# Patient Record
Sex: MALE | Race: WHITE | NOT HISPANIC OR LATINO | Employment: OTHER | URBAN - METROPOLITAN AREA
[De-identification: names, ages, dates, MRNs, and addresses within clinical notes are randomized per-mention and may not be internally consistent; named-entity substitution may affect disease eponyms.]

---

## 2017-01-17 RX ORDER — LOSARTAN POTASSIUM 50 MG/1
TABLET ORAL
Qty: 90 TABLET | Refills: 1 | Status: SHIPPED | OUTPATIENT
Start: 2017-01-17 | End: 2017-07-27 | Stop reason: SDUPTHER

## 2017-02-06 ENCOUNTER — OFFICE VISIT (OUTPATIENT)
Dept: INTERNAL MEDICINE | Facility: CLINIC | Age: 82
End: 2017-02-06

## 2017-02-06 VITALS
RESPIRATION RATE: 16 BRPM | WEIGHT: 195 LBS | BODY MASS INDEX: 30.61 KG/M2 | SYSTOLIC BLOOD PRESSURE: 120 MMHG | DIASTOLIC BLOOD PRESSURE: 70 MMHG | HEIGHT: 67 IN | HEART RATE: 56 BPM | TEMPERATURE: 99.6 F | OXYGEN SATURATION: 97 %

## 2017-02-06 DIAGNOSIS — G47.9 DYSSOMNIA: ICD-10-CM

## 2017-02-06 DIAGNOSIS — N20.0 NEPHROLITHIASIS: ICD-10-CM

## 2017-02-06 DIAGNOSIS — E78.00 PURE HYPERCHOLESTEROLEMIA: ICD-10-CM

## 2017-02-06 DIAGNOSIS — E03.4 HYPOTHYROIDISM DUE TO ACQUIRED ATROPHY OF THYROID: ICD-10-CM

## 2017-02-06 DIAGNOSIS — I10 ESSENTIAL HYPERTENSION: Primary | ICD-10-CM

## 2017-02-06 DIAGNOSIS — R97.20 ELEVATED PSA: ICD-10-CM

## 2017-02-06 DIAGNOSIS — Z00.00 PREVENTATIVE HEALTH CARE: ICD-10-CM

## 2017-02-06 DIAGNOSIS — E11.9 TYPE 2 DIABETES MELLITUS WITHOUT COMPLICATION, WITHOUT LONG-TERM CURRENT USE OF INSULIN (HCC): ICD-10-CM

## 2017-02-06 DIAGNOSIS — Z12.5 SCREENING PSA (PROSTATE SPECIFIC ANTIGEN): ICD-10-CM

## 2017-02-06 PROCEDURE — 99214 OFFICE O/P EST MOD 30 MIN: CPT | Performed by: INTERNAL MEDICINE

## 2017-02-06 NOTE — PATIENT INSTRUCTIONS
1.  Continue usual medicines and supplements - as listed.    2.  Consider Cialis - 1/2 tablet - as needed for erections.    3.  Follow a low-calorie diabetic diet - low in salt and low in sugar.    4.  Exercises every day for physical fitness.    5.  Return in 3 months - fasting checkup and wellness exam.    6.  The nurse will call with test results.    7.  Consider urologist - for erectile dysfunction.

## 2017-02-06 NOTE — PROGRESS NOTES
Subjective   Vic Rahman is a 81 y.o. male.     History of Present Illness     The patient had a recent severe left kidney stone.  It failed lithotripsy and he had a urologic procedure to remove the stone percutaneously.  He has no more flank pain in his urine stream is normal.  The procedure finished on January 23 and he has passed normal blood in his urine.  He has had no fevers or chills.  He has had more erectile dysfunction.    The patient has many years of moderate GERD symptoms.  He has been on Protonix daily and has had marked improvement in his symptoms.  He has had no bloating heartburn or nausea.  He has had no chest pain.  He is required no antacids.    The patient's had a 2 year diagnosis of type 2 diabetes mellitus.  His hemoglobin A1c has been as high as 6.5 but is been down to 6.0 in the last year with diet alone.  She admits to being off of his diet often with too much high sugary foods.  He has no renal disease or neuropathy.  He hasn't an active lifestyle.  He has a history of diabetes in his mother.    The following portions of the patient's history were reviewed and updated as appropriate: allergies, current medications, past family history, past medical history, past social history, past surgical history and problem list.    Review of Systems   Constitutional: Negative for appetite change and fatigue.   HENT: Negative for ear pain and sore throat.    Respiratory: Negative for cough and shortness of breath.    Cardiovascular: Negative for chest pain and palpitations.   Gastrointestinal: Negative for abdominal pain and nausea.   Genitourinary: Negative for dysuria and frequency.        More difficulty with erectile dysfunction failing Viagra.  Urine stream markedly improved since removal of kidney stone   Musculoskeletal: Negative for arthralgias and back pain.   Neurological: Negative for dizziness and headaches.   Psychiatric/Behavioral: Positive for sleep disturbance. Negative for  "dysphoric mood. The patient is not nervous/anxious.         Moderate sleep impairment responds partially to melatonin       Objective   Blood pressure 120/70, pulse 56, temperature 99.6 °F (37.6 °C), temperature source Oral, resp. rate 16, height 67\" (170.2 cm), weight 195 lb (88.5 kg), SpO2 97 %.    Physical Exam   Constitutional: He is oriented to person, place, and time. He appears well-developed and well-nourished. No distress.   Cardiovascular: Normal rate and regular rhythm.    Moderate systolic murmur at apex   Pulmonary/Chest: Effort normal and breath sounds normal. He has no wheezes. He has no rales.   Abdominal: Soft. Bowel sounds are normal. He exhibits no distension and no mass. There is no tenderness.   Neurological: He is alert and oriented to person, place, and time. He exhibits normal muscle tone. Coordination normal.   Skin: Skin is warm and dry. No rash noted.   Psychiatric: He has a normal mood and affect. His behavior is normal. Judgment and thought content normal.   Nursing note and vitals reviewed.    Procedures  Assessment/Plan   Vic was seen today for hyperlipidemia.    Diagnoses and all orders for this visit:    Essential hypertension  -     Comprehensive Metabolic Panel    Pure hypercholesterolemia    Hypothyroidism due to acquired atrophy of thyroid    Type 2 diabetes mellitus without complication, without long-term current use of insulin    Nephrolithiasis  -     Urinalysis With / Culture If Indicated    Elevated PSA  -     PSA Screen  -     Ambulatory Referral to Urology    Preventative health care    Dyssomnia    Screening PSA (prostate specific antigen)  -     PSA Screen    Other orders  -     Microscopic Examination  -     PSA      His PSA is markedly elevated at 32.  The PSA is essentially doubled over the last year and a steady fashion.  We will ask him return to the urologist for reevaluating his PSA with possible biopsy.    Patient's type 2 diabetes mellitus continues on " dietary treatment.  He needs to have a goal weight below 180 by the end of the year.  I've asked him to improve his regular physical fitness daily basis.    The patient's status post renal stone removal and is doing well.  His urinalysis is fully clear.  I've asked him to continue regular water drinking and avoiding calcium supplementations.    Patient Instructions   1.  Continue usual medicines and supplements - as listed.    2.  Consider Cialis - 1/2 tablet - as needed for erections.    3.  Follow a low-calorie diabetic diet - low in salt and low in sugar.    4.  Exercises every day for physical fitness.    5.  Return in 3 months - fasting checkup and wellness exam.    6.  The nurse will call with test results.    7.  Consider urologist - for erectile dysfunction.    8.  New urology consultation for elevated PSA at 32.    9.  Fasting blood glucose elevated 114.  Tighten up diabetic diet and exercises daily.    10. Urinalysis and CMP otherwise acceptable.    11.  Plan on reducing Protonix to 20 mg and adding ranitidine for long-term safety.    Electronically signed Kamran Hanson M.D.2/7/2017 6:31 PM

## 2017-02-07 LAB
ALBUMIN SERPL-MCNC: 4.2 G/DL (ref 3.2–4.8)
ALBUMIN/GLOB SERPL: 1.5 G/DL (ref 1.5–2.5)
ALP SERPL-CCNC: 44 U/L (ref 25–100)
ALT SERPL-CCNC: 13 U/L (ref 7–40)
APPEARANCE UR: CLEAR
AST SERPL-CCNC: 16 U/L (ref 0–33)
BACTERIA #/AREA URNS HPF: NORMAL /HPF
BILIRUB SERPL-MCNC: 0.6 MG/DL (ref 0.3–1.2)
BILIRUB UR QL STRIP: NEGATIVE
BUN SERPL-MCNC: 12 MG/DL (ref 9–23)
BUN/CREAT SERPL: 12 (ref 7–25)
CALCIUM SERPL-MCNC: 9.5 MG/DL (ref 8.7–10.4)
CHLORIDE SERPL-SCNC: 103 MMOL/L (ref 99–109)
CO2 SERPL-SCNC: 32 MMOL/L (ref 20–31)
COLOR UR: YELLOW
CREAT SERPL-MCNC: 1 MG/DL (ref 0.6–1.3)
EPI CELLS #/AREA URNS HPF: NORMAL /HPF
GLOBULIN SER CALC-MCNC: 2.8 GM/DL
GLUCOSE SERPL-MCNC: 114 MG/DL (ref 70–100)
GLUCOSE UR QL: NEGATIVE
HGB UR QL STRIP: NEGATIVE
KETONES UR QL STRIP: NEGATIVE
LEUKOCYTE ESTERASE UR QL STRIP: NEGATIVE
MICRO URNS: NORMAL
MICRO URNS: NORMAL
MUCOUS THREADS URNS QL MICRO: PRESENT /HPF
NITRITE UR QL STRIP: NEGATIVE
PH UR STRIP: 6.5 [PH] (ref 5–7.5)
POTASSIUM SERPL-SCNC: 4.7 MMOL/L (ref 3.5–5.5)
PROT SERPL-MCNC: 7 G/DL (ref 5.7–8.2)
PROT UR QL STRIP: NEGATIVE
PSA SERPL-MCNC: 32.5 NG/ML (ref 0–4)
RBC #/AREA URNS HPF: NORMAL /HPF
SODIUM SERPL-SCNC: 140 MMOL/L (ref 132–146)
SP GR UR: 1.01 (ref 1–1.03)
URINALYSIS REFLEX: NORMAL
UROBILINOGEN UR STRIP-MCNC: 0.2 MG/DL (ref 0.2–1)
WBC #/AREA URNS HPF: NORMAL /HPF

## 2017-02-10 ENCOUNTER — TELEPHONE (OUTPATIENT)
Dept: INTERNAL MEDICINE | Facility: CLINIC | Age: 82
End: 2017-02-10

## 2017-02-10 NOTE — TELEPHONE ENCOUNTER
I called test results to pt. Per TGF  -New urology consult (scheduled) for PSA 32.  -. Tighten up diabetic diet and exercises daily.  -UA & CMP otherwise acceptable.  -Next ox 5/24/17

## 2017-04-01 PROBLEM — C61 PROSTATE CANCER: Status: ACTIVE | Noted: 2017-04-01

## 2017-04-25 ENCOUNTER — TELEPHONE (OUTPATIENT)
Dept: INTERNAL MEDICINE | Facility: CLINIC | Age: 82
End: 2017-04-25

## 2017-04-25 RX ORDER — RANITIDINE 300 MG/1
300 TABLET ORAL NIGHTLY
Qty: 90 TABLET | Refills: 1 | Status: SHIPPED | OUTPATIENT
Start: 2017-04-25 | End: 2017-10-27 | Stop reason: SDUPTHER

## 2017-04-25 RX ORDER — LEVOTHYROXINE SODIUM 0.12 MG/1
125 TABLET ORAL DAILY
Qty: 90 TABLET | Refills: 1 | Status: SHIPPED | OUTPATIENT
Start: 2017-04-25 | End: 2017-09-07 | Stop reason: DRUGHIGH

## 2017-04-25 RX ORDER — PANTOPRAZOLE SODIUM 20 MG/1
20 TABLET, DELAYED RELEASE ORAL EVERY MORNING
Qty: 90 TABLET | Refills: 1 | Status: SHIPPED | OUTPATIENT
Start: 2017-04-25 | End: 2017-09-05

## 2017-04-25 NOTE — TELEPHONE ENCOUNTER
----- Message from Pilar Quick sent at 4/24/2017 11:11 AM EDT -----  Contact: CURT- [phone]  TGF MENTIONED DECREASING PROTONIX AND ADDING RANITIDINE BUT HE NEVER CALLED IT IN. PLEASE CALL IN HIS LEVOTHYROXINE AS WELL- GAGE CHILDS      Pt notified will send in protonix 20mg qam (decreased dose) and ranitidine 300mg qhs per TGF.

## 2017-05-24 ENCOUNTER — OFFICE VISIT (OUTPATIENT)
Dept: INTERNAL MEDICINE | Facility: CLINIC | Age: 82
End: 2017-05-24

## 2017-05-24 VITALS
OXYGEN SATURATION: 97 % | RESPIRATION RATE: 18 BRPM | HEIGHT: 68 IN | SYSTOLIC BLOOD PRESSURE: 118 MMHG | BODY MASS INDEX: 30.01 KG/M2 | DIASTOLIC BLOOD PRESSURE: 70 MMHG | WEIGHT: 198 LBS | TEMPERATURE: 96.9 F | HEART RATE: 60 BPM

## 2017-05-24 DIAGNOSIS — M25.561 CHRONIC PAIN OF BOTH KNEES: ICD-10-CM

## 2017-05-24 DIAGNOSIS — Z00.00 PREVENTATIVE HEALTH CARE: ICD-10-CM

## 2017-05-24 DIAGNOSIS — N20.0 NEPHROLITHIASIS: ICD-10-CM

## 2017-05-24 DIAGNOSIS — E03.4 HYPOTHYROIDISM DUE TO ACQUIRED ATROPHY OF THYROID: ICD-10-CM

## 2017-05-24 DIAGNOSIS — K21.9 GASTROESOPHAGEAL REFLUX DISEASE WITHOUT ESOPHAGITIS: ICD-10-CM

## 2017-05-24 DIAGNOSIS — C61 PROSTATE CANCER (HCC): ICD-10-CM

## 2017-05-24 DIAGNOSIS — E78.00 PURE HYPERCHOLESTEROLEMIA: ICD-10-CM

## 2017-05-24 DIAGNOSIS — G89.29 CHRONIC PAIN OF BOTH KNEES: ICD-10-CM

## 2017-05-24 DIAGNOSIS — E11.9 TYPE 2 DIABETES MELLITUS WITHOUT COMPLICATION, WITHOUT LONG-TERM CURRENT USE OF INSULIN (HCC): Primary | ICD-10-CM

## 2017-05-24 DIAGNOSIS — M25.562 CHRONIC PAIN OF BOTH KNEES: ICD-10-CM

## 2017-05-24 DIAGNOSIS — I10 ESSENTIAL HYPERTENSION: ICD-10-CM

## 2017-05-24 LAB
ALBUMIN SERPL-MCNC: 4.4 G/DL (ref 3.2–4.8)
ALBUMIN/GLOB SERPL: 1.6 G/DL (ref 1.5–2.5)
ALP SERPL-CCNC: 41 U/L (ref 25–100)
ALT SERPL-CCNC: 15 U/L (ref 7–40)
AST SERPL-CCNC: 18 U/L (ref 0–33)
BILIRUB SERPL-MCNC: 0.5 MG/DL (ref 0.3–1.2)
BUN SERPL-MCNC: 17 MG/DL (ref 9–23)
BUN/CREAT SERPL: 18.9 (ref 7–25)
CALCIUM SERPL-MCNC: 9.8 MG/DL (ref 8.7–10.4)
CHLORIDE SERPL-SCNC: 109 MMOL/L (ref 99–109)
CO2 SERPL-SCNC: 29 MMOL/L (ref 20–31)
CREAT SERPL-MCNC: 0.9 MG/DL (ref 0.6–1.3)
GLOBULIN SER CALC-MCNC: 2.8 GM/DL
GLUCOSE SERPL-MCNC: 112 MG/DL (ref 70–100)
HBA1C MFR BLD: 5.9 % (ref 4.8–5.6)
POTASSIUM SERPL-SCNC: 4.5 MMOL/L (ref 3.5–5.5)
PROT SERPL-MCNC: 7.2 G/DL (ref 5.7–8.2)
PSA SERPL-MCNC: 2.84 NG/ML (ref 0–4)
SODIUM SERPL-SCNC: 141 MMOL/L (ref 132–146)
TSH SERPL DL<=0.005 MIU/L-ACNC: 2.06 MIU/ML (ref 0.35–5.35)

## 2017-05-24 PROCEDURE — G0439 PPPS, SUBSEQ VISIT: HCPCS | Performed by: INTERNAL MEDICINE

## 2017-05-24 PROCEDURE — 99214 OFFICE O/P EST MOD 30 MIN: CPT | Performed by: INTERNAL MEDICINE

## 2017-05-24 PROCEDURE — 96160 PT-FOCUSED HLTH RISK ASSMT: CPT | Performed by: INTERNAL MEDICINE

## 2017-05-25 LAB
APPEARANCE UR: CLEAR
BACTERIA #/AREA URNS HPF: NORMAL /HPF
BILIRUB UR QL STRIP: NEGATIVE
COLOR UR: YELLOW
EPI CELLS #/AREA URNS HPF: NORMAL /HPF
GLUCOSE UR QL: NEGATIVE
HGB UR QL STRIP: NEGATIVE
KETONES UR QL STRIP: NEGATIVE
LEUKOCYTE ESTERASE UR QL STRIP: NEGATIVE
MICRO URNS: NORMAL
MICRO URNS: NORMAL
MUCOUS THREADS URNS QL MICRO: PRESENT /HPF
NITRITE UR QL STRIP: NEGATIVE
PH UR STRIP: 6 [PH] (ref 5–7.5)
PROT UR QL STRIP: NEGATIVE
RBC #/AREA URNS HPF: NORMAL /HPF
SP GR UR: 1.01 (ref 1–1.03)
URINALYSIS REFLEX: NORMAL
UROBILINOGEN UR STRIP-MCNC: 0.2 MG/DL (ref 0.2–1)
WBC #/AREA URNS HPF: NORMAL /HPF

## 2017-05-31 ENCOUNTER — TELEPHONE (OUTPATIENT)
Dept: INTERNAL MEDICINE | Facility: CLINIC | Age: 82
End: 2017-05-31

## 2017-07-27 RX ORDER — LOSARTAN POTASSIUM 50 MG/1
TABLET ORAL
Qty: 90 TABLET | Refills: 1 | Status: SHIPPED | OUTPATIENT
Start: 2017-07-27 | End: 2018-02-09 | Stop reason: SDUPTHER

## 2017-08-15 RX ORDER — OXAZEPAM 10 MG
10 CAPSULE ORAL NIGHTLY PRN
Qty: 60 CAPSULE | Refills: 0 | OUTPATIENT
Start: 2017-08-15 | End: 2018-06-25 | Stop reason: SDUPTHER

## 2017-08-15 NOTE — TELEPHONE ENCOUNTER
Refill request received for oxazepam. Pt states rarely takes it, usually when he goes out of town for sleep. Refill auth'd per TGF.

## 2017-09-05 ENCOUNTER — OFFICE VISIT (OUTPATIENT)
Dept: INTERNAL MEDICINE | Facility: CLINIC | Age: 82
End: 2017-09-05

## 2017-09-05 VITALS
HEIGHT: 67 IN | RESPIRATION RATE: 16 BRPM | OXYGEN SATURATION: 97 % | BODY MASS INDEX: 31.39 KG/M2 | SYSTOLIC BLOOD PRESSURE: 110 MMHG | WEIGHT: 200 LBS | HEART RATE: 68 BPM | DIASTOLIC BLOOD PRESSURE: 60 MMHG | TEMPERATURE: 98.1 F

## 2017-09-05 DIAGNOSIS — H61.23 BILATERAL IMPACTED CERUMEN: ICD-10-CM

## 2017-09-05 DIAGNOSIS — C61 PROSTATE CANCER (HCC): ICD-10-CM

## 2017-09-05 DIAGNOSIS — M25.561 CHRONIC PAIN OF BOTH KNEES: ICD-10-CM

## 2017-09-05 DIAGNOSIS — N20.0 NEPHROLITHIASIS: ICD-10-CM

## 2017-09-05 DIAGNOSIS — I10 ESSENTIAL HYPERTENSION: ICD-10-CM

## 2017-09-05 DIAGNOSIS — F33.42 RECURRENT MAJOR DEPRESSIVE DISORDER, IN FULL REMISSION (HCC): ICD-10-CM

## 2017-09-05 DIAGNOSIS — M25.562 CHRONIC PAIN OF BOTH KNEES: ICD-10-CM

## 2017-09-05 DIAGNOSIS — E03.4 HYPOTHYROIDISM DUE TO ACQUIRED ATROPHY OF THYROID: ICD-10-CM

## 2017-09-05 DIAGNOSIS — G47.9 DYSSOMNIA: ICD-10-CM

## 2017-09-05 DIAGNOSIS — G89.29 CHRONIC PAIN OF BOTH KNEES: ICD-10-CM

## 2017-09-05 DIAGNOSIS — E78.00 PURE HYPERCHOLESTEROLEMIA: Primary | ICD-10-CM

## 2017-09-05 DIAGNOSIS — K21.9 GASTROESOPHAGEAL REFLUX DISEASE WITHOUT ESOPHAGITIS: ICD-10-CM

## 2017-09-05 DIAGNOSIS — I05.9 MITRAL VALVE DISEASE: ICD-10-CM

## 2017-09-05 DIAGNOSIS — E11.9 TYPE 2 DIABETES MELLITUS WITHOUT COMPLICATION, WITHOUT LONG-TERM CURRENT USE OF INSULIN (HCC): ICD-10-CM

## 2017-09-05 PROBLEM — H61.20 CERUMEN IMPACTION: Status: ACTIVE | Noted: 2017-09-05

## 2017-09-05 PROCEDURE — 99215 OFFICE O/P EST HI 40 MIN: CPT | Performed by: INTERNAL MEDICINE

## 2017-09-05 PROCEDURE — 93000 ELECTROCARDIOGRAM COMPLETE: CPT | Performed by: INTERNAL MEDICINE

## 2017-09-05 RX ORDER — PANTOPRAZOLE SODIUM 20 MG/1
20 TABLET, DELAYED RELEASE ORAL 3 TIMES WEEKLY
Qty: 90 TABLET | Refills: 1
Start: 2017-09-06 | End: 2017-12-05

## 2017-09-05 RX ORDER — FLUTICASONE PROPIONATE 50 MCG
1 SPRAY, SUSPENSION (ML) NASAL DAILY PRN
Start: 2017-09-05 | End: 2018-05-07

## 2017-09-05 NOTE — PATIENT INSTRUCTIONS
1.  Continue usual medicines and supplements - as listed.    2.  Decrease pantoprazole - Monday Wednesday Friday only.    3.  Follow a low-calorie diabetic diet - low in salt, sugar, and breads.    4.  Maintain exercise program every week - build strength and fitness.    5.  Visit ear specialist - to have wax removed.    6.  Bring blood pressure cuff to the office - to verify accuracy.    7.  Return visit 3 months - fasting checkup.
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
## 2017-09-06 LAB
ALBUMIN SERPL-MCNC: 4.2 G/DL (ref 3.2–4.8)
ALBUMIN/GLOB SERPL: 1.6 G/DL (ref 1.5–2.5)
ALP SERPL-CCNC: 41 U/L (ref 25–100)
ALT SERPL-CCNC: 27 U/L (ref 7–40)
APPEARANCE UR: CLEAR
AST SERPL-CCNC: 23 U/L (ref 0–33)
BACTERIA #/AREA URNS HPF: ABNORMAL /HPF
BASOPHILS # BLD AUTO: 0.02 10*3/MM3 (ref 0–0.2)
BASOPHILS NFR BLD AUTO: 0.4 % (ref 0–1)
BILIRUB SERPL-MCNC: 0.6 MG/DL (ref 0.3–1.2)
BILIRUB UR QL STRIP: NEGATIVE
BUN SERPL-MCNC: 18 MG/DL (ref 9–23)
BUN/CREAT SERPL: 18 (ref 7–25)
CALCIUM SERPL-MCNC: 9.3 MG/DL (ref 8.7–10.4)
CASTS URNS MICRO: ABNORMAL
CHLORIDE SERPL-SCNC: 110 MMOL/L (ref 99–109)
CHOLEST SERPL-MCNC: 214 MG/DL (ref 0–200)
CO2 SERPL-SCNC: 27 MMOL/L (ref 20–31)
COLOR UR: YELLOW
CREAT SERPL-MCNC: 1 MG/DL (ref 0.6–1.3)
CRP SERPL-MCNC: 0.44 MG/DL (ref 0–1)
EOSINOPHIL # BLD AUTO: 0.14 10*3/MM3 (ref 0–0.3)
EOSINOPHIL NFR BLD AUTO: 2.9 % (ref 0–3)
EPI CELLS #/AREA URNS HPF: ABNORMAL /HPF
ERYTHROCYTE [DISTWIDTH] IN BLOOD BY AUTOMATED COUNT: 13.3 % (ref 11.3–14.5)
GLOBULIN SER CALC-MCNC: 2.6 GM/DL
GLUCOSE SERPL-MCNC: 95 MG/DL (ref 70–100)
GLUCOSE UR QL: NEGATIVE
HBA1C MFR BLD: 6.2 % (ref 4.8–5.6)
HCT VFR BLD AUTO: 42.4 % (ref 38.9–50.9)
HDLC SERPL-MCNC: 62 MG/DL (ref 40–60)
HGB BLD-MCNC: 13.9 G/DL (ref 13.1–17.5)
HGB UR QL STRIP: NEGATIVE
IMM GRANULOCYTES # BLD: 0.01 10*3/MM3 (ref 0–0.03)
IMM GRANULOCYTES NFR BLD: 0.2 % (ref 0–0.6)
KETONES UR QL STRIP: NEGATIVE
LDLC SERPL CALC-MCNC: 123 MG/DL (ref 0–100)
LEUKOCYTE ESTERASE UR QL STRIP: ABNORMAL
LYMPHOCYTES # BLD AUTO: 1.07 10*3/MM3 (ref 0.6–4.8)
LYMPHOCYTES NFR BLD AUTO: 22.3 % (ref 24–44)
MCH RBC QN AUTO: 30.8 PG (ref 27–31)
MCHC RBC AUTO-ENTMCNC: 32.8 G/DL (ref 32–36)
MCV RBC AUTO: 94 FL (ref 80–99)
MONOCYTES # BLD AUTO: 0.49 10*3/MM3 (ref 0–1)
MONOCYTES NFR BLD AUTO: 10.2 % (ref 0–12)
NEUTROPHILS # BLD AUTO: 3.07 10*3/MM3 (ref 1.5–8.3)
NEUTROPHILS NFR BLD AUTO: 64 % (ref 41–71)
NITRITE UR QL STRIP: NEGATIVE
PH UR STRIP: 6.5 [PH] (ref 5–8)
PLATELET # BLD AUTO: 164 10*3/MM3 (ref 150–450)
POTASSIUM SERPL-SCNC: 4.9 MMOL/L (ref 3.5–5.5)
PROT SERPL-MCNC: 6.8 G/DL (ref 5.7–8.2)
PROT UR QL STRIP: NEGATIVE
PSA SERPL-MCNC: 0.89 NG/ML (ref 0–4)
RBC # BLD AUTO: 4.51 10*6/MM3 (ref 4.2–5.76)
RBC #/AREA URNS HPF: ABNORMAL /HPF
SODIUM SERPL-SCNC: 139 MMOL/L (ref 132–146)
SP GR UR: 1.02 (ref 1–1.03)
T3FREE SERPL-MCNC: 3 PG/ML (ref 2–4.4)
T4 FREE SERPL-MCNC: 1.08 NG/DL (ref 0.89–1.76)
TRIGL SERPL-MCNC: 144 MG/DL (ref 0–150)
TSH SERPL DL<=0.005 MIU/L-ACNC: 5.5 MIU/ML (ref 0.35–5.35)
UROBILINOGEN UR STRIP-MCNC: ABNORMAL MG/DL
VLDLC SERPL CALC-MCNC: 28.8 MG/DL
WBC # BLD AUTO: 4.8 10*3/MM3 (ref 3.5–10.8)
WBC #/AREA URNS HPF: ABNORMAL /HPF

## 2017-09-06 RX ORDER — PRAVASTATIN SODIUM 20 MG
1 TABLET ORAL DAILY
COMMUNITY
End: 2017-09-07 | Stop reason: SDUPTHER

## 2017-09-06 RX ORDER — UBIDECARENONE 200 MG
1 CAPSULE ORAL DAILY
COMMUNITY
End: 2017-09-07 | Stop reason: SDUPTHER

## 2017-09-07 ENCOUNTER — TELEPHONE (OUTPATIENT)
Dept: INTERNAL MEDICINE | Facility: CLINIC | Age: 82
End: 2017-09-07

## 2017-09-07 RX ORDER — PRAVASTATIN SODIUM 20 MG
20 TABLET ORAL DAILY
Qty: 90 TABLET | Refills: 1 | Status: SHIPPED | OUTPATIENT
Start: 2017-09-07 | End: 2018-03-07 | Stop reason: SDUPTHER

## 2017-09-07 RX ORDER — LEVOTHYROXINE SODIUM 137 UG/1
137 CAPSULE ORAL DAILY
Qty: 90 CAPSULE | Refills: 1 | Status: SHIPPED | OUTPATIENT
Start: 2017-09-07 | End: 2017-09-08

## 2017-09-07 RX ORDER — UBIDECARENONE 200 MG
200 CAPSULE ORAL DAILY
Qty: 90 CAPSULE | Refills: 1 | Status: SHIPPED | OUTPATIENT
Start: 2017-09-07 | End: 2019-08-08

## 2017-09-07 NOTE — TELEPHONE ENCOUNTER
Called labs to pt.  Per TGF:  -PSA improved 0.89.   -HbA1c incr to 6.2%. Follow tight low sugar diabetic diet.    -LDL elev 123. pravastatin 20 mg qd & CQ10 200 mg qd Repeat blood tests after first month of treatment.   -TSH 5.5 = poor thyroid control. Incr [id number] MCG QD.  Repeat blood test after first month of treatment.    Verify routine thyroid medication.  -Other tests ok - no tx change  -Start ASA 81 mg QD for primary prevention.  -Stop all tobacco use, use sugar-free gum as needed.  Pt verb understanding.

## 2017-09-08 ENCOUNTER — TELEPHONE (OUTPATIENT)
Dept: INTERNAL MEDICINE | Facility: CLINIC | Age: 82
End: 2017-09-08

## 2017-09-08 RX ORDER — LEVOTHYROXINE SODIUM 137 UG/1
137 TABLET ORAL DAILY
Qty: 90 TABLET | Refills: 1 | Status: SHIPPED | OUTPATIENT
Start: 2017-09-08 | End: 2017-12-07

## 2017-09-08 NOTE — TELEPHONE ENCOUNTER
----- Message from Anant Sal sent at 9/8/2017 10:35 AM EDT -----  Contact: RASHARD KIM [facility] [place]  CLARIFICATION ABOUT THE MEDICATION THAT WAS CALLED IN YESTERDAY.  [place]ANK [phone]      Levothyroxine was sent in for capsules instead of usual tablets. New script for tablets sent in.

## 2017-10-17 DIAGNOSIS — E03.4 HYPOTHYROIDISM DUE TO ACQUIRED ATROPHY OF THYROID: Primary | ICD-10-CM

## 2017-10-17 DIAGNOSIS — E78.00 PURE HYPERCHOLESTEROLEMIA: ICD-10-CM

## 2017-10-19 ENCOUNTER — LAB REQUISITION (OUTPATIENT)
Dept: LAB | Facility: HOSPITAL | Age: 82
End: 2017-10-19

## 2017-10-19 DIAGNOSIS — Z00.00 ROUTINE GENERAL MEDICAL EXAMINATION AT A HEALTH CARE FACILITY: ICD-10-CM

## 2017-10-19 PROCEDURE — 36415 COLL VENOUS BLD VENIPUNCTURE: CPT | Performed by: INTERNAL MEDICINE

## 2017-10-20 LAB
ALBUMIN SERPL-MCNC: 4.3 G/DL (ref 3.2–4.8)
ALBUMIN/GLOB SERPL: 1.7 G/DL (ref 1.5–2.5)
ALP SERPL-CCNC: 44 U/L (ref 25–100)
ALT SERPL-CCNC: 23 U/L (ref 7–40)
AST SERPL-CCNC: 21 U/L (ref 0–33)
BILIRUB SERPL-MCNC: 0.5 MG/DL (ref 0.3–1.2)
BUN SERPL-MCNC: 20 MG/DL (ref 9–23)
BUN/CREAT SERPL: 20 (ref 7–25)
CALCIUM SERPL-MCNC: 9.4 MG/DL (ref 8.7–10.4)
CHLORIDE SERPL-SCNC: 108 MMOL/L (ref 99–109)
CHOLEST SERPL-MCNC: 160 MG/DL (ref 0–200)
CO2 SERPL-SCNC: 25 MMOL/L (ref 20–31)
CREAT SERPL-MCNC: 1 MG/DL (ref 0.6–1.3)
GFR SERPLBLD CREATININE-BSD FMLA CKD-EPI: 72 ML/MIN/1.73
GFR SERPLBLD CREATININE-BSD FMLA CKD-EPI: 87 ML/MIN/1.73
GLOBULIN SER CALC-MCNC: 2.5 GM/DL
GLUCOSE SERPL-MCNC: 98 MG/DL (ref 70–100)
HDLC SERPL-MCNC: 62 MG/DL (ref 40–60)
LDLC SERPL CALC-MCNC: 69 MG/DL (ref 0–100)
POTASSIUM SERPL-SCNC: 4.2 MMOL/L (ref 3.5–5.5)
PROT SERPL-MCNC: 6.8 G/DL (ref 5.7–8.2)
SODIUM SERPL-SCNC: 139 MMOL/L (ref 132–146)
T3FREE SERPL-MCNC: 3.3 PG/ML (ref 2–4.4)
T4 FREE SERPL-MCNC: 1.42 NG/DL (ref 0.89–1.76)
TRIGL SERPL-MCNC: 143 MG/DL (ref 0–150)
TSH SERPL DL<=0.005 MIU/L-ACNC: 5.12 MIU/ML (ref 0.35–5.35)
VLDLC SERPL CALC-MCNC: 28.6 MG/DL

## 2017-10-22 ENCOUNTER — RESULTS ENCOUNTER (OUTPATIENT)
Dept: INTERNAL MEDICINE | Facility: CLINIC | Age: 82
End: 2017-10-22

## 2017-10-22 DIAGNOSIS — E78.00 PURE HYPERCHOLESTEROLEMIA: ICD-10-CM

## 2017-10-22 DIAGNOSIS — E03.4 HYPOTHYROIDISM DUE TO ACQUIRED ATROPHY OF THYROID: ICD-10-CM

## 2017-10-30 RX ORDER — RANITIDINE 300 MG/1
TABLET ORAL
Qty: 90 TABLET | Refills: 1 | Status: SHIPPED | OUTPATIENT
Start: 2017-10-30 | End: 2018-05-08 | Stop reason: SDUPTHER

## 2017-12-05 ENCOUNTER — LAB REQUISITION (OUTPATIENT)
Dept: LAB | Facility: HOSPITAL | Age: 82
End: 2017-12-05

## 2017-12-05 ENCOUNTER — OFFICE VISIT (OUTPATIENT)
Dept: INTERNAL MEDICINE | Facility: CLINIC | Age: 82
End: 2017-12-05

## 2017-12-05 VITALS
TEMPERATURE: 97.8 F | WEIGHT: 203 LBS | HEART RATE: 64 BPM | SYSTOLIC BLOOD PRESSURE: 110 MMHG | RESPIRATION RATE: 16 BRPM | DIASTOLIC BLOOD PRESSURE: 60 MMHG | OXYGEN SATURATION: 95 % | BODY MASS INDEX: 31.79 KG/M2

## 2017-12-05 DIAGNOSIS — J21.9 ACUTE BRONCHIOLITIS DUE TO UNSPECIFIED ORGANISM: ICD-10-CM

## 2017-12-05 DIAGNOSIS — E03.4 HYPOTHYROIDISM DUE TO ACQUIRED ATROPHY OF THYROID: ICD-10-CM

## 2017-12-05 DIAGNOSIS — E11.9 TYPE 2 DIABETES MELLITUS WITHOUT COMPLICATION, WITHOUT LONG-TERM CURRENT USE OF INSULIN (HCC): ICD-10-CM

## 2017-12-05 DIAGNOSIS — C61 PROSTATE CANCER (HCC): ICD-10-CM

## 2017-12-05 DIAGNOSIS — E78.00 PURE HYPERCHOLESTEROLEMIA: Primary | ICD-10-CM

## 2017-12-05 DIAGNOSIS — Z87.891 PERSONAL HISTORY OF TOBACCO USE, PRESENTING HAZARDS TO HEALTH: ICD-10-CM

## 2017-12-05 DIAGNOSIS — Z00.00 ROUTINE GENERAL MEDICAL EXAMINATION AT A HEALTH CARE FACILITY: ICD-10-CM

## 2017-12-05 DIAGNOSIS — N20.0 NEPHROLITHIASIS: ICD-10-CM

## 2017-12-05 DIAGNOSIS — I10 ESSENTIAL HYPERTENSION: ICD-10-CM

## 2017-12-05 DIAGNOSIS — K21.9 GASTROESOPHAGEAL REFLUX DISEASE WITHOUT ESOPHAGITIS: ICD-10-CM

## 2017-12-05 PROCEDURE — 99214 OFFICE O/P EST MOD 30 MIN: CPT | Performed by: INTERNAL MEDICINE

## 2017-12-05 RX ORDER — AZITHROMYCIN 250 MG/1
TABLET, FILM COATED ORAL
Qty: 60 TABLET | Refills: 0 | Status: SHIPPED | OUTPATIENT
Start: 2017-12-05 | End: 2017-12-14

## 2017-12-05 NOTE — PROGRESS NOTES
Subjective   Vic Rahman is a 82 y.o. male.     History of Present Illness     The patient's had increased recurrent heartburn in the last few months.  He has stopped Protonix and is using Zantac alone for acid suppression.  He continues to smoke a pipe daily.  He has had no nausea or vomiting.  He has a history of GERD symptomatology now for more than 10 years.    The following portions of the patient's history were reviewed and updated as appropriate: allergies, current medications, past family history, past medical history, past social history, past surgical history and problem list.    Review of Systems   Constitutional: Negative for appetite change and fatigue.   HENT: Negative for ear pain and sore throat.    Respiratory: Negative for cough and shortness of breath.    Cardiovascular: Negative for chest pain and palpitations.   Gastrointestinal: Negative for abdominal pain and nausea.   Endocrine: Positive for heat intolerance.   Musculoskeletal: Positive for arthralgias and gait problem. Negative for back pain.   Neurological: Negative for dizziness and headaches.       Objective   Blood pressure 110/60, pulse 64, temperature 97.8 °F (36.6 °C), temperature source Oral, resp. rate 16, weight 92.1 kg (203 lb), SpO2 95 %.    Physical Exam   Constitutional: He is oriented to person, place, and time. He appears well-developed and well-nourished. No distress.   HENT:   Right Ear: External ear normal.   Left Ear: External ear normal.   Mouth/Throat: Oropharynx is clear and moist.   Neck: Normal range of motion. Neck supple. No JVD present.   Cardiovascular: Normal rate, regular rhythm and normal heart sounds.    Pulmonary/Chest: Effort normal and breath sounds normal. He has no wheezes. He has no rales.   Lymphadenopathy:     He has no cervical adenopathy.   Neurological: He is alert and oriented to person, place, and time. He exhibits normal muscle tone. Coordination normal.   Psychiatric: He has a normal mood  and affect. His behavior is normal. Judgment and thought content normal.   Nursing note and vitals reviewed.    Procedures  Assessment/Plan   Vic was seen today for heartburn.    Diagnoses and all orders for this visit:    Pure hypercholesterolemia  -     Comprehensive Metabolic Panel  -     Lipid Panel    Essential hypertension    Personal history of tobacco use, presenting hazards to health    Nephrolithiasis    Type 2 diabetes mellitus without complication, without long-term current use of insulin  -     Hemoglobin A1c    Hypothyroidism due to acquired atrophy of thyroid  -     T3, Free  -     T4, Free  -     TSH    Gastroesophageal reflux disease without esophagitis    Acute bronchiolitis due to unspecified organism  -     CBC & Differential  -     C-reactive Protein    Prostate cancer  -     PSA    Other orders  -     azithromycin (ZITHROMAX Z-LEANN) 250 MG tablet; Take 2 tablets the first day, then 1 tablet daily for 4 days.  -     T4, Free  -     TSH  -     T3, Free  -     levothyroxine (SYNTHROID, LEVOTHROID) 150 MCG tablet; Take 1 tablet by mouth Daily.  -     metFORMIN ER (GLUCOPHAGE XR) 500 MG 24 hr tablet; Take 1 tablet by mouth Daily With Breakfast.    The patient has more GERD symptoms likely related to continued tobacco use.  I strongly advised him to stop all tobacco and use Pepto-Bismol as needed.  Recurrent PPIs aren't unnecessary risk for cognitive impairment.      The patient has 2 weeks of persistent cough congestion.  After full discussion he has opted for a Z-Leann to clear his congestion for the coming week.  I've cautioned him to continued cigarette smoking will likely cause Sr. Relapse.    Patient's diabetic control has worsened now with his highest hemoglobin A1c.  Metformin and improve diabetic diet should manage his diabetes well.    The patient's thyroid studies are again borderline low.  He should increase Synthroid and recheck in about 6 weeks.    [facility]  [facility].  He has some hot flashes but he feels are manageable.  His PSA has dropped from 0.9-0.5.    Patient Instructions   1.  At your discretion - start azithromycin 2 tablets now - and one tablet daily for 4 more days - for bronchitis.    2.  Stop all tobacco - use nicotine mints as needed - to prevent  tobacco craving.    3.  Follow a low-calorie diabetic diet - low in salt and low in sugar.    4.  Continue regular visits with urologist - for prostate cancer.    5.  Take Pepto-Bismol 1 tablespoon or 1 tablet - at bedtime - to prevent heartburn.    6.  The nurse will call with test results.    7.  Return visit 3 months - fasting checkup.    8.  Thyroid blood test indicate need for higher Synthroid dose - increased 150 MCG each morning on an empty stomach.    9.  Hemoglobin A1c elevated 6.9%.  Start metformin  mg each morning for better by diabetic control.    10.  Return in late January for repeat thyroid studies and hemoglobin A1c.    11.  Red count and chemistry panel otherwise acceptable.    Electronically signed Kamran Hanson M.D.12/7/2017 7:24 AM

## 2017-12-05 NOTE — PATIENT INSTRUCTIONS
1.  At your discretion - start azithromycin 2 tablets now - and one tablet daily for 4 more days - for bronchitis.    2.  Stop all tobacco - use nicotine mints as needed - to prevent  tobacco craving.    3.  Follow a low-calorie diabetic diet - low in salt and low in sugar.    4.  Continue regular visits with urologist - for prostate cancer.    5.  Take Pepto-Bismol 1 tablespoon or 1 tablet - at bedtime - to prevent heartburn.    6.  The nurse will call with test results.    7.  Return visit 3 months - fasting checkup.

## 2017-12-06 LAB
ALBUMIN SERPL-MCNC: 4.4 G/DL (ref 3.2–4.8)
ALBUMIN/GLOB SERPL: 1.8 G/DL (ref 1.5–2.5)
ALP SERPL-CCNC: 53 U/L (ref 25–100)
ALT SERPL-CCNC: 30 U/L (ref 7–40)
AST SERPL-CCNC: 31 U/L (ref 0–33)
BASOPHILS # BLD AUTO: 0.01 10*3/MM3 (ref 0–0.2)
BASOPHILS NFR BLD AUTO: 0.1 % (ref 0–1)
BILIRUB SERPL-MCNC: 0.5 MG/DL (ref 0.3–1.2)
BUN SERPL-MCNC: 19 MG/DL (ref 9–23)
BUN/CREAT SERPL: 15.8 (ref 7–25)
CALCIUM SERPL-MCNC: 9.4 MG/DL (ref 8.7–10.4)
CHLORIDE SERPL-SCNC: 106 MMOL/L (ref 99–109)
CHOLEST SERPL-MCNC: 136 MG/DL (ref 0–200)
CO2 SERPL-SCNC: 25 MMOL/L (ref 20–31)
CREAT SERPL-MCNC: 1.2 MG/DL (ref 0.6–1.3)
CRP SERPL-MCNC: 3.57 MG/DL (ref 0–1)
EOSINOPHIL # BLD AUTO: 0.09 10*3/MM3 (ref 0–0.3)
EOSINOPHIL NFR BLD AUTO: 1.2 % (ref 0–3)
ERYTHROCYTE [DISTWIDTH] IN BLOOD BY AUTOMATED COUNT: 13.7 % (ref 11.3–14.5)
GFR SERPLBLD CREATININE-BSD FMLA CKD-EPI: 58 ML/MIN/1.73
GFR SERPLBLD CREATININE-BSD FMLA CKD-EPI: 70 ML/MIN/1.73
GLOBULIN SER CALC-MCNC: 2.4 GM/DL
GLUCOSE SERPL-MCNC: 103 MG/DL (ref 70–100)
HBA1C MFR BLD: 6.9 % (ref 4.8–5.6)
HCT VFR BLD AUTO: 42 % (ref 38.9–50.9)
HDLC SERPL-MCNC: 56 MG/DL (ref 40–60)
HGB BLD-MCNC: 14.1 G/DL (ref 13.1–17.5)
IMM GRANULOCYTES # BLD: 0.01 10*3/MM3 (ref 0–0.03)
IMM GRANULOCYTES NFR BLD: 0.1 % (ref 0–0.6)
LDLC SERPL CALC-MCNC: 61 MG/DL (ref 0–100)
LYMPHOCYTES # BLD AUTO: 0.93 10*3/MM3 (ref 0.6–4.8)
LYMPHOCYTES NFR BLD AUTO: 11.9 % (ref 24–44)
MCH RBC QN AUTO: 31.3 PG (ref 27–31)
MCHC RBC AUTO-ENTMCNC: 33.6 G/DL (ref 32–36)
MCV RBC AUTO: 93.3 FL (ref 80–99)
MONOCYTES # BLD AUTO: 0.58 10*3/MM3 (ref 0–1)
MONOCYTES NFR BLD AUTO: 7.4 % (ref 0–12)
NEUTROPHILS # BLD AUTO: 6.18 10*3/MM3 (ref 1.5–8.3)
NEUTROPHILS NFR BLD AUTO: 79.3 % (ref 41–71)
PLATELET # BLD AUTO: 150 10*3/MM3 (ref 150–450)
POTASSIUM SERPL-SCNC: 5 MMOL/L (ref 3.5–5.5)
PROT SERPL-MCNC: 6.8 G/DL (ref 5.7–8.2)
PSA SERPL-MCNC: 0.45 NG/ML (ref 0–4)
RBC # BLD AUTO: 4.5 10*6/MM3 (ref 4.2–5.76)
SODIUM SERPL-SCNC: 142 MMOL/L (ref 132–146)
T3FREE SERPL-MCNC: 2.8 PG/ML (ref 2–4.4)
T4 FREE SERPL-MCNC: 1.11 NG/DL (ref 0.89–1.76)
TRIGL SERPL-MCNC: 94 MG/DL (ref 0–150)
TSH SERPL DL<=0.005 MIU/L-ACNC: 5.48 MIU/ML (ref 0.35–5.35)
VLDLC SERPL CALC-MCNC: 18.8 MG/DL
WBC # BLD AUTO: 7.8 10*3/MM3 (ref 3.5–10.8)

## 2017-12-07 RX ORDER — LEVOTHYROXINE SODIUM 0.15 MG/1
150 TABLET ORAL DAILY
Qty: 90 TABLET | Refills: 3 | Status: SHIPPED | OUTPATIENT
Start: 2017-12-07 | End: 2018-04-03 | Stop reason: SDUPTHER

## 2017-12-07 RX ORDER — METFORMIN HYDROCHLORIDE 500 MG/1
500 TABLET, EXTENDED RELEASE ORAL
Qty: 90 TABLET | Refills: 3 | Status: SHIPPED | OUTPATIENT
Start: 2017-12-07 | End: 2018-03-31 | Stop reason: SDUPTHER

## 2017-12-14 ENCOUNTER — OFFICE VISIT (OUTPATIENT)
Dept: INTERNAL MEDICINE | Facility: CLINIC | Age: 82
End: 2017-12-14

## 2017-12-14 VITALS
HEART RATE: 64 BPM | OXYGEN SATURATION: 97 % | WEIGHT: 206 LBS | BODY MASS INDEX: 31.32 KG/M2 | RESPIRATION RATE: 16 BRPM | SYSTOLIC BLOOD PRESSURE: 126 MMHG | TEMPERATURE: 97.6 F | DIASTOLIC BLOOD PRESSURE: 70 MMHG

## 2017-12-14 DIAGNOSIS — J21.9 ACUTE BRONCHIOLITIS DUE TO UNSPECIFIED ORGANISM: Primary | ICD-10-CM

## 2017-12-14 DIAGNOSIS — G47.9 DYSSOMNIA: ICD-10-CM

## 2017-12-14 DIAGNOSIS — K21.9 GASTROESOPHAGEAL REFLUX DISEASE WITHOUT ESOPHAGITIS: ICD-10-CM

## 2017-12-14 PROCEDURE — 99213 OFFICE O/P EST LOW 20 MIN: CPT | Performed by: INTERNAL MEDICINE

## 2017-12-14 RX ORDER — LEVOFLOXACIN 500 MG/1
500 TABLET, FILM COATED ORAL DAILY
Qty: 7 TABLET | Refills: 0 | Status: SHIPPED | OUTPATIENT
Start: 2017-12-14 | End: 2017-12-20

## 2017-12-14 NOTE — PATIENT INSTRUCTIONS
1.  Start levofloxacin 1 tablet now and every morning for 7 days.    2.  Use Mucinex DM - one tablet at bedtime to suppress cough.    3.  Use benzonatate - as needed - to treat cough.    4.  Use Stiolto -  1 whiff every morning - to improve breathing.    5.  Return visit Wednesday, December 20 - nonfasting checkup.

## 2017-12-14 NOTE — PROGRESS NOTES
Subjective   Vic Rahman is a 82 y.o. male.     History of Present Illness     The patient's had several weeks of a persistent cough.  Last week and was somewhat productive and he started on a Z-Ty.  The cough intensified over the weekend and Monday he went to a walk-in clinic.  The chest x-ray was normal.  He was changed from Zithromax to Omnicef.  He was given prednisone and benzonatate.  He has made no progress the last few days.    The following portions of the patient's history were reviewed and updated as appropriate: allergies, current medications, past family history, past medical history, past social history, past surgical history and problem list.    Review of Systems   Constitutional: Positive for fatigue. Negative for appetite change, chills and fever.   HENT: Positive for congestion. Negative for sore throat.    Respiratory: Positive for cough and shortness of breath. Negative for wheezing.    Cardiovascular: Positive for chest pain. Negative for palpitations.        Burning chest on deep cough   Gastrointestinal: Negative for abdominal distention and nausea.   Neurological: Negative for dizziness and light-headedness.       Objective   Blood pressure 126/70, pulse 64, temperature 97.6 °F (36.4 °C), temperature source Oral, resp. rate 16, weight 93.4 kg (206 lb), SpO2 97 %.    Physical Exam   Constitutional: He is oriented to person, place, and time. He appears well-developed and well-nourished. No distress.   mild general distress   HENT:   Right Ear: External ear normal.   Left Ear: External ear normal.   Mouth/Throat: Oropharynx is clear and moist.   Neck: Normal range of motion. Neck supple. No JVD present.   Cardiovascular: Normal rate, regular rhythm and normal heart sounds.    Pulmonary/Chest: Effort normal and breath sounds normal. He has no wheezes. He has no rales.   Lymphadenopathy:     He has no cervical adenopathy.   Neurological: He is alert and oriented to person, place, and time.    Psychiatric: He has a normal mood and affect. His behavior is normal. Judgment and thought content normal.   Nursing note and vitals reviewed.    Procedures  Assessment/Plan   Vic was seen today for cough.    Diagnoses and all orders for this visit:    Acute bronchiolitis due to unspecified organism    Gastroesophageal reflux disease without esophagitis    Dyssomnia    Other orders  -     levoFLOXacin (LEVAQUIN) 500 MG tablet; Take 1 tablet by mouth Daily. Now and every morning for 7 days    The patient's bronchitis has failed both Zithromax and Omnicef.  Levaquin is his best option at this point.  He should continue to use cough suppressants.    The patient has some degree of airway tightness.  Prednisone has not worked and will likely worsen his diabetes.  I've given him a long-acting bronchodilator to improve breathing.    The patient has chronic GERD which may be contributing to his symptom complex.  I've asked him to use Nexium nightly over the next few weeks.    Patient Instructions   1.  Start levofloxacin 1 tablet now and every morning for 7 days.    2.  Use Mucinex DM - one tablet at bedtime to suppress cough.    3.  Use benzonatate - as needed - to treat cough.    4.  Use Stiolto -  1 whiff every morning - to improve breathing.    5.  Return visit Wednesday, December 20 - nonfasting checkup.    6.  Continue off pipe to keep lungs healthy.    7.  Use Nexium 20 mg 2 tablets at bedtime over the next 2 weeks.    Electronically signed Kamran Hanson M.D.12/14/2017 5:13 PM

## 2017-12-20 ENCOUNTER — OFFICE VISIT (OUTPATIENT)
Dept: INTERNAL MEDICINE | Facility: CLINIC | Age: 82
End: 2017-12-20

## 2017-12-20 VITALS
BODY MASS INDEX: 30.87 KG/M2 | RESPIRATION RATE: 16 BRPM | HEART RATE: 68 BPM | DIASTOLIC BLOOD PRESSURE: 60 MMHG | OXYGEN SATURATION: 98 % | TEMPERATURE: 97.8 F | WEIGHT: 203 LBS | SYSTOLIC BLOOD PRESSURE: 120 MMHG

## 2017-12-20 DIAGNOSIS — J21.9 ACUTE BRONCHIOLITIS DUE TO UNSPECIFIED ORGANISM: ICD-10-CM

## 2017-12-20 DIAGNOSIS — K59.09 CHRONIC CONSTIPATION: Primary | ICD-10-CM

## 2017-12-20 PROCEDURE — 99213 OFFICE O/P EST LOW 20 MIN: CPT | Performed by: INTERNAL MEDICINE

## 2017-12-20 RX ORDER — POLYETHYLENE GLYCOL 3350 17 G/17G
17 POWDER, FOR SOLUTION ORAL DAILY PRN
COMMUNITY

## 2017-12-20 NOTE — PATIENT INSTRUCTIONS
1.  Stop inhaler - resume normal medications    2.  Take 1 capful of MiraLAX powder - in 6 ounces of water -  3 times today and tomorrow - to move bowels.    3.  Take MiraLAX powder - 1 tsp in juice every morning - to regulate bowels.    4.  Resume daily walking - maintain strength.    5.  Return visit March 28 - fasting checkup.

## 2017-12-20 NOTE — PROGRESS NOTES
Subjective   Vic Rahman is a 82 y.o. male.     History of Present Illness     The patient's had more difficulty with constipation this week.  He has gone 3 days with no bowel movement.  He has been on stool softeners with no benefit.  He is been much more inactive this months particularly due to his illness.  He feels constipation is been more of a problem in the last year.    The following portions of the patient's history were reviewed and updated as appropriate: allergies, current medications, past family history, past medical history, past social history, past surgical history and problem list.    Review of Systems   Constitutional: Negative for appetite change and fatigue.   Respiratory: Negative for cough and shortness of breath.    Cardiovascular: Negative for chest pain and palpitations.   Gastrointestinal: Positive for constipation. Negative for abdominal distention, abdominal pain and nausea.   Neurological: Negative for dizziness and light-headedness.       Objective   Blood pressure 120/60, pulse 68, temperature 97.8 °F (36.6 °C), temperature source Oral, resp. rate 16, weight 92.1 kg (203 lb), SpO2 98 %.    Physical Exam   Constitutional: He is oriented to person, place, and time. He appears well-developed and well-nourished. No distress.   HENT:   Right Ear: External ear normal.   Left Ear: External ear normal.   Mouth/Throat: Oropharynx is clear and moist.   Cardiovascular: Normal rate, regular rhythm and normal heart sounds.    Pulmonary/Chest: Effort normal and breath sounds normal. He has no wheezes. He has no rales.   Abdominal: Soft. Bowel sounds are normal. He exhibits no distension and no mass. There is no tenderness.   Neurological: He is alert and oriented to person, place, and time. He exhibits normal muscle tone. Coordination normal.   Psychiatric: He has a normal mood and affect. His behavior is normal. Judgment and thought content normal.   Nursing note and vitals  reviewed.    Procedures  Assessment/Plan   Vic was seen today for constipation.    Diagnoses and all orders for this visit:    Chronic constipation    Acute bronchiolitis due to unspecified organism    The patient's constipation is likely multifactorial related to his advanced age and inactivity.  I've cautioned him about using narcotics.  Increasing his daily walking more greatly helped his bowels.  I've asked to be patient with MiraLAX over the next few days.    The patient had a recent severe bronchitis that failed Z-Ty.  He is done much better finishing Levaquin.  He is Airways are working well now and he can stop bronchodilators.    Patient Instructions   1.  Stop inhaler - resume normal medications    2.  Take 1 capful of MiraLAX powder - in 6 ounces of water -  3 times today and tomorrow - to move bowels.    3.  Take MiraLAX powder - 1 tsp in juice every morning - to regulate bowels.    4.  Resume daily walking - maintain strength.    5.  Return visit March 28 - fasting checkup.    Electronically signed Kamran Hanson M.D.12/22/2017 2:55 PM

## 2017-12-22 ENCOUNTER — TELEPHONE (OUTPATIENT)
Dept: INTERNAL MEDICINE | Facility: CLINIC | Age: 82
End: 2017-12-22

## 2017-12-22 NOTE — TELEPHONE ENCOUNTER
Pt had been taking miralax am and night only. Advised per TGF to take 1 capful miralax tid until bowels move and then qam to keep bowels regulated. Pt verb understanding.

## 2017-12-22 NOTE — TELEPHONE ENCOUNTER
----- Message from Katja Dewey sent at 12/22/2017  1:17 PM EST -----  Contact: patient  CALL BACK: patient was told by TGF to call back today with report.  Cough/cold is better but is still constipated.  Has not gone to bathroom yet.  Please call

## 2018-02-09 RX ORDER — LOSARTAN POTASSIUM 50 MG/1
TABLET ORAL
Qty: 90 TABLET | Refills: 1 | Status: SHIPPED | OUTPATIENT
Start: 2018-02-09 | End: 2018-08-17 | Stop reason: SDUPTHER

## 2018-03-07 RX ORDER — PRAVASTATIN SODIUM 20 MG
TABLET ORAL
Qty: 90 TABLET | Refills: 1 | Status: SHIPPED | OUTPATIENT
Start: 2018-03-07 | End: 2018-10-15 | Stop reason: SDUPTHER

## 2018-03-28 ENCOUNTER — OFFICE VISIT (OUTPATIENT)
Dept: INTERNAL MEDICINE | Facility: CLINIC | Age: 83
End: 2018-03-28

## 2018-03-28 ENCOUNTER — LAB REQUISITION (OUTPATIENT)
Dept: LAB | Facility: HOSPITAL | Age: 83
End: 2018-03-28

## 2018-03-28 VITALS
WEIGHT: 203 LBS | RESPIRATION RATE: 16 BRPM | DIASTOLIC BLOOD PRESSURE: 60 MMHG | OXYGEN SATURATION: 96 % | HEART RATE: 64 BPM | SYSTOLIC BLOOD PRESSURE: 110 MMHG | TEMPERATURE: 96.9 F | BODY MASS INDEX: 30.87 KG/M2

## 2018-03-28 DIAGNOSIS — M25.561 CHRONIC PAIN OF BOTH KNEES: Primary | ICD-10-CM

## 2018-03-28 DIAGNOSIS — I10 ESSENTIAL HYPERTENSION: ICD-10-CM

## 2018-03-28 DIAGNOSIS — E78.00 PURE HYPERCHOLESTEROLEMIA: ICD-10-CM

## 2018-03-28 DIAGNOSIS — G89.29 CHRONIC PAIN OF BOTH KNEES: Primary | ICD-10-CM

## 2018-03-28 DIAGNOSIS — M25.562 CHRONIC PAIN OF BOTH KNEES: Primary | ICD-10-CM

## 2018-03-28 DIAGNOSIS — G47.9 DYSSOMNIA: ICD-10-CM

## 2018-03-28 DIAGNOSIS — Z00.00 ROUTINE GENERAL MEDICAL EXAMINATION AT A HEALTH CARE FACILITY: ICD-10-CM

## 2018-03-28 DIAGNOSIS — K21.9 GASTROESOPHAGEAL REFLUX DISEASE WITHOUT ESOPHAGITIS: ICD-10-CM

## 2018-03-28 DIAGNOSIS — E03.4 HYPOTHYROIDISM DUE TO ACQUIRED ATROPHY OF THYROID: ICD-10-CM

## 2018-03-28 DIAGNOSIS — E11.9 TYPE 2 DIABETES MELLITUS WITHOUT COMPLICATION, WITHOUT LONG-TERM CURRENT USE OF INSULIN (HCC): ICD-10-CM

## 2018-03-28 PROBLEM — J21.9 ACUTE BRONCHIOLITIS: Status: RESOLVED | Noted: 2017-12-05 | Resolved: 2018-03-28

## 2018-03-28 LAB
ALBUMIN SERPL-MCNC: 4.5 G/DL (ref 3.2–4.8)
ALBUMIN/GLOB SERPL: 1.7 G/DL (ref 1.5–2.5)
ALP SERPL-CCNC: 46 U/L (ref 25–100)
ALT SERPL-CCNC: 19 U/L (ref 7–40)
AST SERPL-CCNC: 17 U/L (ref 0–33)
BILIRUB SERPL-MCNC: 0.6 MG/DL (ref 0.3–1.2)
BUN SERPL-MCNC: 23 MG/DL (ref 9–23)
BUN/CREAT SERPL: 19.2 (ref 7–25)
CALCIUM SERPL-MCNC: 9.6 MG/DL (ref 8.7–10.4)
CHLORIDE SERPL-SCNC: 102 MMOL/L (ref 99–109)
CHOLEST SERPL-MCNC: 165 MG/DL (ref 0–200)
CO2 SERPL-SCNC: 28 MMOL/L (ref 20–31)
CREAT SERPL-MCNC: 1.2 MG/DL (ref 0.6–1.3)
CRP SERPL-MCNC: 0.08 MG/DL (ref 0–1)
GFR SERPLBLD CREATININE-BSD FMLA CKD-EPI: 58 ML/MIN/1.73
GFR SERPLBLD CREATININE-BSD FMLA CKD-EPI: 70 ML/MIN/1.73
GLOBULIN SER CALC-MCNC: 2.6 GM/DL
GLUCOSE SERPL-MCNC: 130 MG/DL (ref 70–100)
HBA1C MFR BLD: 7 % (ref 4.8–5.6)
HDLC SERPL-MCNC: 80 MG/DL (ref 40–60)
LDLC SERPL CALC-MCNC: 68 MG/DL (ref 0–100)
POTASSIUM SERPL-SCNC: 5 MMOL/L (ref 3.5–5.5)
PROT SERPL-MCNC: 7.1 G/DL (ref 5.7–8.2)
SODIUM SERPL-SCNC: 139 MMOL/L (ref 132–146)
T4 FREE SERPL-MCNC: 1.29 NG/DL (ref 0.89–1.76)
TRIGL SERPL-MCNC: 87 MG/DL (ref 0–150)
TSH SERPL DL<=0.005 MIU/L-ACNC: 6.18 MIU/ML (ref 0.35–5.35)
VLDLC SERPL CALC-MCNC: 17.4 MG/DL

## 2018-03-28 PROCEDURE — 99214 OFFICE O/P EST MOD 30 MIN: CPT | Performed by: INTERNAL MEDICINE

## 2018-03-28 PROCEDURE — 36415 COLL VENOUS BLD VENIPUNCTURE: CPT | Performed by: INTERNAL MEDICINE

## 2018-03-28 RX ORDER — OMEPRAZOLE 20 MG/1
20 CAPSULE, DELAYED RELEASE ORAL DAILY PRN
Qty: 90 CAPSULE | Refills: 1 | Status: SHIPPED | OUTPATIENT
Start: 2018-03-28 | End: 2019-02-22 | Stop reason: SDUPTHER

## 2018-03-28 NOTE — PATIENT INSTRUCTIONS
1.  Start omeprazole 20 mg each morning - for 2 weeks - then take - Monday Wednesday Friday only.    2.  Use Pepto-Bismol once or twice daily - as needed for indigestion.    3.  Continue usual medicines and supplements - as listed.    4.  Follow a low-calorie diabetic diet - low in salt low in sugar.    5.  Return visit early May - preoperative evaluation and wellness exam.

## 2018-03-28 NOTE — PROGRESS NOTES
Subjective   Vic Rahman is a 82 y.o. male.     History of Present Illness     The patient was diagnosed with type 2 diabetes mellitus in 2015.  He was maintained on a diabetic diet and lifestyle and kept his hemoglobin A1c below 6.5% without medications until last year.  He was started on metformin in December admits that he is not very tight with his diabetic diet.  He has not been doing his daily walking program.  Complications to health include obesity, hyperlipidemia, and GERD.    The following portions of the patient's history were reviewed and updated as appropriate: allergies, current medications, past family history, past medical history, past social history, past surgical history and problem list.    Review of Systems   Constitutional: Negative for appetite change and fatigue.   HENT: Negative for ear pain and sore throat.    Respiratory: Negative for cough and shortness of breath.    Cardiovascular: Negative for chest pain and palpitations.   Gastrointestinal: Positive for constipation. Negative for abdominal pain and nausea.   Musculoskeletal: Negative for arthralgias and back pain.   Neurological: Negative for dizziness and headaches.   Psychiatric/Behavioral: Positive for sleep disturbance.       Objective   Blood pressure 110/60, pulse 64, temperature 96.9 °F (36.1 °C), temperature source Oral, resp. rate 16, weight 92.1 kg (203 lb), SpO2 96 %.    Physical Exam   Constitutional: He is oriented to person, place, and time. He appears well-developed and well-nourished. No distress.   Neck: Normal range of motion. Neck supple. No JVD present.   Cardiovascular: Normal rate and regular rhythm.    Moderate systolic murmur at apex   Pulmonary/Chest: Effort normal and breath sounds normal.   Abdominal: Soft. Bowel sounds are normal. He exhibits no mass. There is no tenderness.   Lymphadenopathy:     He has no cervical adenopathy.   Neurological: He is alert and oriented to person, place, and time. He  exhibits normal muscle tone. Coordination normal.   Psychiatric: He has a normal mood and affect. His behavior is normal. Judgment and thought content normal.   Nursing note and vitals reviewed.    Procedures  Assessment/Plan   Vic was seen today for diabetes.    Diagnoses and all orders for this visit:    Chronic pain of both knees  -     C-reactive Protein    Pure hypercholesterolemia  -     Comprehensive Metabolic Panel  -     Lipid Panel    Essential hypertension    Gastroesophageal reflux disease without esophagitis    Hypothyroidism due to acquired atrophy of thyroid  -     TSH  -     T4, Free  -     T3, Free    Type 2 diabetes mellitus without complication, without long-term current use of insulin  -     Hemoglobin A1c    Dyssomnia    Other orders  -     omeprazole (PRILOSEC) 20 MG capsule; Take 1 capsule by mouth Daily As Needed (Heart burn).  -     metFORMIN ER (GLUCOPHAGE XR) 500 MG 24 hr tablet; Take 2 tablets by mouth Daily With Breakfast.      The patient's diabetes has worsened.  It is highly likely he has not been consistent with metformin.  If he has, he will need to double his dose.  His goal hemoglobin A1c is below 6.5.    The patient's GERD is in remission on omeprazole.  He should continue ranitidine at night and bring his omeprazole down to Monday Wednesday Friday schedule.  He should wean off of omeprazole for long-term safety.    The patient does have a adult history of using a pipe.  He has been repeatedly counseled to stop despite but he feels powerless in doing so.  I've asked him to use no tobacco products and he should consider using sugar-free lozenges.    The patient's TSH has risen to 6.2.  Free levels indicate a low normal value.  He should be counseled on consistency with his thyroid medication.  If he has been consistent he will need to increase levothyroxine to 75 MCG and continue monthly testing until his values normalize.    Patient Instructions   1.  Start omeprazole 20 mg  each morning - for 2 weeks - then take - Monday Wednesday Friday only.    2.  Use Pepto-Bismol once or twice daily - as needed for indigestion.    3.  Continue usual medicines and supplements - as listed.    4.  Follow a low-calorie diabetic diet - low in salt low in sugar.    5.  Return visit early May - preoperative evaluation and wellness exam.    6.  Hemoglobin A1c was 7.0%.  Verify patient taking metformin.  Increase metformin 500 mg SR - 2 tablets every morning.    7.  TSH abnormal at 6.2.  Increase levothyroxine 175 MCG daily.  Repeat TSH after 30 days.    8.  LDL cholesterol excellent at 68.    9.  Chemistry panel otherwise acceptable.    10.  Fasting blood glucose elevated 130.  Tighten diabetic diet.  Increase daily walking.    Electronically signed Kamran Hanson M.D.3/31/2018 8:43 AM

## 2018-03-29 LAB — T3FREE SERPL-MCNC: 2.3 PG/ML (ref 2–4.4)

## 2018-03-31 RX ORDER — METFORMIN HYDROCHLORIDE 500 MG/1
1000 TABLET, EXTENDED RELEASE ORAL
Qty: 90 TABLET | Refills: 3 | Status: SHIPPED | OUTPATIENT
Start: 2018-03-31 | End: 2018-05-11 | Stop reason: SDUPTHER

## 2018-04-02 ENCOUNTER — TELEPHONE (OUTPATIENT)
Dept: INTERNAL MEDICINE | Facility: CLINIC | Age: 83
End: 2018-04-02

## 2018-04-02 NOTE — TELEPHONE ENCOUNTER
Attempted to call labs to pt. Got VMs both cell and home phone.  Too much for VM.  Left VMs at both numbers for pt to call.

## 2018-04-03 ENCOUNTER — TELEPHONE (OUTPATIENT)
Dept: INTERNAL MEDICINE | Facility: CLINIC | Age: 83
End: 2018-04-03

## 2018-04-03 RX ORDER — LEVOTHYROXINE SODIUM 175 UG/1
175 TABLET ORAL DAILY
Qty: 90 TABLET | Refills: 1 | Status: SHIPPED | OUTPATIENT
Start: 2018-04-03 | End: 2018-11-19 | Stop reason: SDUPTHER

## 2018-04-03 NOTE — TELEPHONE ENCOUNTER
HbA1c was 7.0%. Verify pt taking metformin 500 qd.    Incr metformin 500 mg SR - 2 tabs q AM - RX already sent to [facility] by TGF    FBG elevated 130. Tighten diabetic diet. Incr walking QD     TSH abnormal at 6.2.  Incr levothyroxine 175 MCG QD TGF will repeat TSH at 5/7 ox.  [facility]     LDL exc at 68.     Chem panel otherwise acceptable.    Pt verb understanding and compliance.

## 2018-05-07 ENCOUNTER — OFFICE VISIT (OUTPATIENT)
Dept: INTERNAL MEDICINE | Facility: CLINIC | Age: 83
End: 2018-05-07

## 2018-05-07 ENCOUNTER — LAB REQUISITION (OUTPATIENT)
Dept: LAB | Facility: HOSPITAL | Age: 83
End: 2018-05-07

## 2018-05-07 VITALS
TEMPERATURE: 96.9 F | DIASTOLIC BLOOD PRESSURE: 72 MMHG | WEIGHT: 206.4 LBS | HEART RATE: 65 BPM | BODY MASS INDEX: 31.28 KG/M2 | OXYGEN SATURATION: 97 % | RESPIRATION RATE: 16 BRPM | SYSTOLIC BLOOD PRESSURE: 122 MMHG | HEIGHT: 68 IN

## 2018-05-07 DIAGNOSIS — M25.562 CHRONIC PAIN OF BOTH KNEES: ICD-10-CM

## 2018-05-07 DIAGNOSIS — E78.00 PURE HYPERCHOLESTEROLEMIA: Primary | ICD-10-CM

## 2018-05-07 DIAGNOSIS — E11.9 TYPE 2 DIABETES MELLITUS WITHOUT COMPLICATION, WITHOUT LONG-TERM CURRENT USE OF INSULIN (HCC): ICD-10-CM

## 2018-05-07 DIAGNOSIS — K59.09 CHRONIC CONSTIPATION: ICD-10-CM

## 2018-05-07 DIAGNOSIS — K21.9 GASTROESOPHAGEAL REFLUX DISEASE WITHOUT ESOPHAGITIS: ICD-10-CM

## 2018-05-07 DIAGNOSIS — I10 ESSENTIAL HYPERTENSION: ICD-10-CM

## 2018-05-07 DIAGNOSIS — E03.4 HYPOTHYROIDISM DUE TO ACQUIRED ATROPHY OF THYROID: ICD-10-CM

## 2018-05-07 DIAGNOSIS — I05.9 MITRAL VALVE DISEASE: ICD-10-CM

## 2018-05-07 DIAGNOSIS — G89.29 CHRONIC PAIN OF BOTH KNEES: ICD-10-CM

## 2018-05-07 DIAGNOSIS — Z00.00 ROUTINE GENERAL MEDICAL EXAMINATION AT A HEALTH CARE FACILITY: ICD-10-CM

## 2018-05-07 DIAGNOSIS — M25.561 CHRONIC PAIN OF BOTH KNEES: ICD-10-CM

## 2018-05-07 LAB
ALBUMIN SERPL-MCNC: 4.3 G/DL (ref 3.2–4.8)
ALBUMIN/GLOB SERPL: 1.9 G/DL (ref 1.5–2.5)
ALP SERPL-CCNC: 39 U/L (ref 25–100)
ALT SERPL-CCNC: 24 U/L (ref 7–40)
AST SERPL-CCNC: 21 U/L (ref 0–33)
BASOPHILS # BLD AUTO: 0.03 10*3/MM3 (ref 0–0.2)
BASOPHILS NFR BLD AUTO: 0.6 % (ref 0–1)
BILIRUB SERPL-MCNC: 0.5 MG/DL (ref 0.3–1.2)
BUN SERPL-MCNC: 18 MG/DL (ref 9–23)
BUN/CREAT SERPL: 18 (ref 7–25)
CALCIUM SERPL-MCNC: 9.1 MG/DL (ref 8.7–10.4)
CHLORIDE SERPL-SCNC: 107 MMOL/L (ref 99–109)
CHOLEST SERPL-MCNC: 138 MG/DL (ref 0–200)
CO2 SERPL-SCNC: 29 MMOL/L (ref 20–31)
CREAT SERPL-MCNC: 1 MG/DL (ref 0.6–1.3)
CRP SERPL-MCNC: 0.18 MG/DL (ref 0–1)
EOSINOPHIL # BLD AUTO: 0.1 10*3/MM3 (ref 0–0.3)
EOSINOPHIL NFR BLD AUTO: 1.9 % (ref 0–3)
ERYTHROCYTE [DISTWIDTH] IN BLOOD BY AUTOMATED COUNT: 13.8 % (ref 11.3–14.5)
GFR SERPLBLD CREATININE-BSD FMLA CKD-EPI: 72 ML/MIN/1.73
GFR SERPLBLD CREATININE-BSD FMLA CKD-EPI: 87 ML/MIN/1.73
GLOBULIN SER CALC-MCNC: 2.3 GM/DL
GLUCOSE SERPL-MCNC: 118 MG/DL (ref 70–100)
HBA1C MFR BLD: 7.3 % (ref 4.8–5.6)
HCT VFR BLD AUTO: 40.9 % (ref 38.9–50.9)
HDLC SERPL-MCNC: 70 MG/DL (ref 40–60)
HGB BLD-MCNC: 13.6 G/DL (ref 13.1–17.5)
IMM GRANULOCYTES # BLD: 0.01 10*3/MM3 (ref 0–0.03)
IMM GRANULOCYTES NFR BLD: 0.2 % (ref 0–0.6)
LDLC SERPL CALC-MCNC: 50 MG/DL (ref 0–100)
LYMPHOCYTES # BLD AUTO: 1.17 10*3/MM3 (ref 0.6–4.8)
LYMPHOCYTES NFR BLD AUTO: 22 % (ref 24–44)
MCH RBC QN AUTO: 28.9 PG (ref 27–31)
MCHC RBC AUTO-ENTMCNC: 33.3 G/DL (ref 32–36)
MCV RBC AUTO: 87 FL (ref 80–99)
MONOCYTES # BLD AUTO: 0.75 10*3/MM3 (ref 0–1)
MONOCYTES NFR BLD AUTO: 14.1 % (ref 0–12)
NEUTROPHILS # BLD AUTO: 3.27 10*3/MM3 (ref 1.5–8.3)
NEUTROPHILS NFR BLD AUTO: 61.2 % (ref 41–71)
PLATELET # BLD AUTO: 171 10*3/MM3 (ref 150–450)
POTASSIUM SERPL-SCNC: 4.7 MMOL/L (ref 3.5–5.5)
PROT SERPL-MCNC: 6.6 G/DL (ref 5.7–8.2)
RBC # BLD AUTO: 4.7 10*6/MM3 (ref 4.2–5.76)
SODIUM SERPL-SCNC: 142 MMOL/L (ref 132–146)
T4 FREE SERPL-MCNC: 1.63 NG/DL (ref 0.89–1.76)
TRIGL SERPL-MCNC: 92 MG/DL (ref 0–150)
TSH SERPL DL<=0.005 MIU/L-ACNC: 1.06 MIU/ML (ref 0.35–5.35)
VLDLC SERPL CALC-MCNC: 18.4 MG/DL
WBC # BLD AUTO: 5.33 10*3/MM3 (ref 3.5–10.8)

## 2018-05-07 PROCEDURE — G0439 PPPS, SUBSEQ VISIT: HCPCS | Performed by: INTERNAL MEDICINE

## 2018-05-07 PROCEDURE — 99214 OFFICE O/P EST MOD 30 MIN: CPT | Performed by: INTERNAL MEDICINE

## 2018-05-07 PROCEDURE — 93000 ELECTROCARDIOGRAM COMPLETE: CPT | Performed by: INTERNAL MEDICINE

## 2018-05-07 PROCEDURE — 36415 COLL VENOUS BLD VENIPUNCTURE: CPT | Performed by: INTERNAL MEDICINE

## 2018-05-07 NOTE — PATIENT INSTRUCTIONS
1.  Continue usual medicines and supplements - as listed.    2.  Stop aspirin - 7 days prior to surgery - to minimize bleeding.    3.  Follow a low-calorie diabetic diet - low in salt low in sugar.    4.  Bring down body weight - 1 pound monthly - over the next 2 year.    5.  Use walker regularly following surgery - until posture and safety are well-established.    6.  Copy of Pre-op exam and testing - To Salvatore Murphy M.D. at [facility]    7.  Next checkup - late June non-fasting.

## 2018-05-07 NOTE — PROGRESS NOTES
QUICK REFERENCE INFORMATION:  The ABCs of the Annual Wellness Visit    Subsequent Medicare Wellness Visit    HEALTH RISK ASSESSMENT    1935    Recent Hospitalizations:  No hospitalization(s) within the last year..        Current Medical Providers:  Patient Care Team:  Kamran Hanson MD as PCP - General  Kamran Hanson MD as PCP - Family Medicine        Smoking Status:  History   Smoking Status   • Current Every Day Smoker   • Types: Pipe   • Start date: 1980   Smokeless Tobacco   • Never Used       Alcohol Consumption:  History   Alcohol Use No     Comment: rare Alcohol not monthly       Depression Screen:   PHQ-2/PHQ-9 Depression Screening 5/24/2017   Little interest or pleasure in doing things 1   Feeling down, depressed, or hopeless 1   Trouble falling or staying asleep, or sleeping too much 0   Feeling tired or having little energy 1   Poor appetite or overeating 0   Feeling bad about yourself - or that you are a failure or have let yourself or your family down 1   Trouble concentrating on things, such as reading the newspaper or watching television 0   Moving or speaking so slowly that other people could have noticed. Or the opposite - being so fidgety or restless that you have been moving around a lot more than usual 1   Thoughts that you would be better off dead, or of hurting yourself in some way 0   Total Score 5   If you checked off any problems, how difficult have these problems made it for you to do your work, take care of things at home, or get along with other people? Somewhat difficult       Health Habits and Functional and Cognitive Screening:  Functional & Cognitive Status 5/24/2017   Do you have difficulty preparing food and eating? No   Do you have difficulty bathing yourself, getting dressed or grooming yourself? No   Do you have difficulty using the toilet? No   Do you have difficulty moving around from place to place? No   In the past year have you fallen or experienced a near fall?  No   Current Diet Low Carb Diet   Dental Exam Up to date   Eye Exam Up to date   Exercise (times per week) 2 times per week   Current Exercise Activities Include Stationary Bicycling/Spin Class   Do you need help using the phone?  No   Are you deaf or do you have serious difficulty hearing?  No   Do you need help with transportation? No   Do you need help shopping? No   Do you need help preparing meals?  No   Do you need help with housework?  No   Do you need help with laundry? No   Do you need help taking your medications? No   Do you need help managing money? No           Does the patient have evidence of cognitive impairment? No    Aspirin use counseling: Taking ASA appropriately as indicated      Recent Lab Results:  CMP:  Lab Results   Component Value Date     (H) 03/28/2018    BUN 23 03/28/2018    CREATININE 1.20 03/28/2018    EGFRIFNONA 58 (L) 03/28/2018    EGFRIFAFRI 70 03/28/2018    BCR 19.2 03/28/2018     03/28/2018    K 5.0 03/28/2018    CO2 28.0 03/28/2018    CALCIUM 9.6 03/28/2018    PROTENTOTREF 7.1 03/28/2018    ALBUMIN 4.50 03/28/2018    LABGLOBREF 2.6 03/28/2018    LABIL2 1.7 03/28/2018    BILITOT 0.6 03/28/2018    ALKPHOS 46 03/28/2018    AST 17 03/28/2018    ALT 19 03/28/2018     Lipid Panel:  Lab Results   Component Value Date    CHOL 147 10/24/2016    TRIG 87 03/28/2018    HDL 80 (H) 03/28/2018    VLDL 17.4 03/28/2018     HbA1c:  Lab Results   Component Value Date    HGBA1C 7.00 (H) 03/28/2018       Visual Acuity:  No exam data present    Age-appropriate Screening Schedule:  Refer to the list below for future screening recommendations based on patient's age, sex and/or medical conditions. Orders for these recommended tests are listed in the plan section. The patient has been provided with a written plan.    Health Maintenance   Topic Date Due   • URINE MICROALBUMIN  1935   • PNEUMOCOCCAL VACCINES (65+ LOW/MEDIUM RISK) (2 of 2 - PCV13) 08/05/2014   • DIABETIC EYE EXAM   04/01/2018   • DIABETIC FOOT EXAM  05/24/2018   • INFLUENZA VACCINE  08/01/2018   • HEMOGLOBIN A1C  09/28/2018   • LIPID PANEL  03/28/2019   • TDAP/TD VACCINES (2 - Td) 02/12/2023   • ZOSTER VACCINE  Completed        Subjective   History of Present Illness    Vic Rahman is a 83 y.o. male who presents for an Subsequent Wellness Visit.    The following portions of the patient's history were reviewed and updated as appropriate: allergies, current medications, past family history, past medical history, past social history, past surgical history and problem list.    Outpatient Medications Prior to Visit   Medication Sig Dispense Refill   • aspirin 81 MG tablet Take 1 tablet by mouth Daily.     • Coenzyme Q10 200 MG capsule Take 200 mg by mouth Daily. 90 capsule 1   • docusate sodium (COLACE) 100 MG capsule Take 2 capsules by mouth Daily.     • levothyroxine (SYNTHROID, LEVOTHROID) 175 MCG tablet Take 1 tablet by mouth Daily. (Stop 150 mcg tablets) 90 tablet 1   • losartan (COZAAR) 50 MG tablet TAKE ONE TABLET BY MOUTH EVERY MORNING 90 tablet 1   • Melatonin 3 MG capsule Take 1 capsule by mouth every night.     • metFORMIN ER (GLUCOPHAGE XR) 500 MG 24 hr tablet Take 2 tablets by mouth Daily With Breakfast. 90 tablet 3   • Multiple Vitamins-Minerals (ICAPS PO) Take 1 capsule by mouth daily.     • omeprazole (PRILOSEC) 20 MG capsule Take 1 capsule by mouth Daily As Needed (Heart burn). 90 capsule 1   • oxazepam (SERAX) 10 MG capsule Take 1 capsule by mouth At Night As Needed for Sleep. 60 capsule 0   • polyethylene glycol (MIRALAX) powder Take 12 g by mouth Daily.     • pravastatin (PRAVACHOL) 20 MG tablet TAKE ONE TABLET BY MOUTH DAILY 90 tablet 1   • raNITIdine (ZANTAC) 300 MG tablet TAKE ONE TABLET BY MOUTH EVERY NIGHT AT BEDTIME 90 tablet 1   • albuterol (PROVENTIL HFA;VENTOLIN HFA) 108 (90 Base) MCG/ACT inhaler Inhale 2 puffs Every 6 (Six) Hours As Needed for Wheezing. 1 inhaler 0   • bismuth subsalicylate (PEPTO  "BISMOL) 262 MG/15ML suspension Take 15 mL by mouth 2 (Two) Times a Day As Needed.     • fluticasone (FLONASE) 50 MCG/ACT nasal spray 1 spray into each nostril Daily As Needed.       No facility-administered medications prior to visit.        Patient Active Problem List   Diagnosis   • Depression   • Type 2 diabetes mellitus   • Gastroesophageal reflux disease   • Hearing impairment   • Hyperlipidemia   • Hypertension   • Hypothyroidism   • Knee pain   • Mitral valve disease   • Dyssomnia   • Preventative health care   • Nephrolithiasis   • Prostate cancer   • Cerumen impaction   • Personal history of tobacco use, presenting hazards to health   • Chronic constipation       Advance Care Planning:  has an advance directive - a copy HAS NOT been provided    Identification of Risk Factors:  Risk factors include: weight , unhealthy diet, inactivity, increased fall risk and chronic pain.    Review of Systems    Compared to one year ago, the patient feels his physical health is the same.  Compared to one year ago, the patient feels his mental health is the same.    Objective     Physical Exam    Vitals:    05/07/18 0952   BP: 122/72   BP Location: Right arm   Patient Position: Sitting   Cuff Size: Adult   Pulse: 65   Resp: 16   Temp: 96.9 °F (36.1 °C)   TempSrc: Temporal Artery    SpO2: 97%   Weight: 93.6 kg (206 lb 6.4 oz)   Height: 172 cm (67.72\")       Patient's Body mass index is 31.65 kg/m². BMI is above normal parameters. Follow-up plan includes:  educational material, exercise counseling and nutrition counseling.      Assessment/Plan   Patient Self-Management and Personalized Health Advice  The patient has been provided with information about: diet, exercise, weight management, prevention of cardiac or vascular disease, the relationship between weight and GERD and fall prevention and preventive services including:   · Exercise counseling provided, Fall Risk assessment done, Fall Risk plan of care done, Nutrition " counseling provided.    Visit Diagnoses:    ICD-10-CM ICD-9-CM   1. Pure hypercholesterolemia E78.00 272.0   2. Essential hypertension I10 401.9   3. Mitral valve disease I05.9 394.9   4. Chronic constipation K59.09 564.00   5. Gastroesophageal reflux disease without esophagitis K21.9 530.81   6. Hypothyroidism due to acquired atrophy of thyroid E03.4 244.8     246.8   7. Type 2 diabetes mellitus without complication, without long-term current use of insulin E11.9 250.00   8. Chronic pain of both knees M25.561 719.46    M25.562 338.29    G89.29        Orders Placed This Encounter   Procedures   • Comprehensive Metabolic Panel   • C-reactive Protein   • Lipid Panel   • Hemoglobin A1c   • Urinalysis With / Culture If Indicated - Urine, Clean Catch   • TSH   • T4, Free   • T3, Free   • ECG 12 Lead     Order Specific Question:   Reason for Exam:     Answer:   MR   • CBC & Differential     Order Specific Question:   Manual Differential     Answer:   No       Outpatient Encounter Prescriptions as of 5/7/2018   Medication Sig Dispense Refill   • aspirin 81 MG tablet Take 1 tablet by mouth Daily.     • Coenzyme Q10 200 MG capsule Take 200 mg by mouth Daily. 90 capsule 1   • docusate sodium (COLACE) 100 MG capsule Take 2 capsules by mouth Daily.     • levothyroxine (SYNTHROID, LEVOTHROID) 175 MCG tablet Take 1 tablet by mouth Daily. (Stop 150 mcg tablets) 90 tablet 1   • losartan (COZAAR) 50 MG tablet TAKE ONE TABLET BY MOUTH EVERY MORNING 90 tablet 1   • Melatonin 3 MG capsule Take 1 capsule by mouth every night.     • metFORMIN ER (GLUCOPHAGE XR) 500 MG 24 hr tablet Take 2 tablets by mouth Daily With Breakfast. 90 tablet 3   • Multiple Vitamins-Minerals (ICAPS PO) Take 1 capsule by mouth daily.     • omeprazole (PRILOSEC) 20 MG capsule Take 1 capsule by mouth Daily As Needed (Heart burn). 90 capsule 1   • oxazepam (SERAX) 10 MG capsule Take 1 capsule by mouth At Night As Needed for Sleep. 60 capsule 0   • polyethylene  glycol (MIRALAX) powder Take 12 g by mouth Daily.     • pravastatin (PRAVACHOL) 20 MG tablet TAKE ONE TABLET BY MOUTH DAILY 90 tablet 1   • raNITIdine (ZANTAC) 300 MG tablet TAKE ONE TABLET BY MOUTH EVERY NIGHT AT BEDTIME 90 tablet 1   • [DISCONTINUED] albuterol (PROVENTIL HFA;VENTOLIN HFA) 108 (90 Base) MCG/ACT inhaler Inhale 2 puffs Every 6 (Six) Hours As Needed for Wheezing. 1 inhaler 0   • [DISCONTINUED] bismuth subsalicylate (PEPTO BISMOL) 262 MG/15ML suspension Take 15 mL by mouth 2 (Two) Times a Day As Needed.     • [DISCONTINUED] fluticasone (FLONASE) 50 MCG/ACT nasal spray 1 spray into each nostril Daily As Needed.       No facility-administered encounter medications on file as of 5/7/2018.        Reviewed use of high risk medication in the elderly: yes  Reviewed for potential of harmful drug interactions in the elderly: yes    Follow Up:  Return in about 6 weeks (around 6/18/2018) for Recheck nonfasting.     An After Visit Summary and PPPS with all of these plans were given to the patient.        The wellness exam has been reviewed in detail.  The patient has been fully counseled on preventative guidelines for vaccines, cancer screenings, and other health maintenance needs.  Functional testing has been performed to assess capacity for independent living and need for other medical interventions.   The patient was counseled on maintaining a lifestyle to promote good health and to minimize chronic diseases.  The patient has been assisted with scheduling healthcare procedures for the coming year and given a written document outlining these recommendations.    Electronically signed Kamran Hanson M.D.5/7/2018 10:37 AM
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
## 2018-05-08 LAB
APPEARANCE UR: ABNORMAL
BACTERIA #/AREA URNS HPF: ABNORMAL /HPF
BILIRUB UR QL STRIP: NEGATIVE
COLOR UR: YELLOW
CRYSTALS URNS MICRO: ABNORMAL
EPI CELLS #/AREA URNS HPF: ABNORMAL /HPF
GLUCOSE UR QL: NEGATIVE
HGB UR QL STRIP: NEGATIVE
KETONES UR QL STRIP: NEGATIVE
LEUKOCYTE ESTERASE UR QL STRIP: NEGATIVE
MICRO URNS: ABNORMAL
MICRO URNS: ABNORMAL
MUCOUS THREADS URNS QL MICRO: PRESENT /HPF
NITRITE UR QL STRIP: NEGATIVE
PH UR STRIP: 7 [PH] (ref 5–7.5)
PROT UR QL STRIP: NEGATIVE
RBC #/AREA URNS HPF: ABNORMAL /HPF
SP GR UR: 1.02 (ref 1–1.03)
T3FREE SERPL-MCNC: 4.3 PG/ML (ref 2–4.4)
UNIDENT CRYS URNS QL MICRO: PRESENT
URINALYSIS REFLEX: ABNORMAL
UROBILINOGEN UR STRIP-MCNC: 0.2 MG/DL (ref 0.2–1)
WBC #/AREA URNS HPF: ABNORMAL /HPF

## 2018-05-08 RX ORDER — RANITIDINE 300 MG/1
TABLET ORAL
Qty: 90 TABLET | Refills: 1 | Status: SHIPPED | OUTPATIENT
Start: 2018-05-08 | End: 2020-02-20

## 2018-05-11 ENCOUNTER — TELEPHONE (OUTPATIENT)
Dept: INTERNAL MEDICINE | Facility: CLINIC | Age: 83
End: 2018-05-11

## 2018-05-11 RX ORDER — METFORMIN HYDROCHLORIDE 500 MG/1
1000 TABLET, EXTENDED RELEASE ORAL 2 TIMES DAILY
Qty: 120 TABLET | Refills: 5 | Status: SHIPPED | OUTPATIENT
Start: 2018-05-11 | End: 2019-08-05 | Stop reason: SDUPTHER

## 2018-05-11 NOTE — TELEPHONE ENCOUNTER
Called labs to pt. Per TGF:  -Hemoglobin A1c more elevated 7.3%.  Increase metformin  MG - 2 tablets twice daily.  -LDL cholesterol acceptable at 50.  Continue pravastatin.  -Other laboratory tests are acceptable and require no change in treatment.  -Eventually develop an exercise program with stationary cycling 20 minutes 3 days weekly and water aerobics 2 or 3 days weekly.

## 2018-06-15 ENCOUNTER — TELEPHONE (OUTPATIENT)
Dept: INTERNAL MEDICINE | Facility: CLINIC | Age: 83
End: 2018-06-15

## 2018-06-25 RX ORDER — OXAZEPAM 10 MG
CAPSULE ORAL
Qty: 60 CAPSULE | Refills: 0 | OUTPATIENT
Start: 2018-06-25 | End: 2019-08-05 | Stop reason: SDUPTHER

## 2018-08-20 RX ORDER — LOSARTAN POTASSIUM 50 MG/1
TABLET ORAL
Qty: 90 TABLET | Refills: 0 | Status: SHIPPED | OUTPATIENT
Start: 2018-08-20 | End: 2018-11-27 | Stop reason: SDUPTHER

## 2018-09-06 RX ORDER — PRAVASTATIN SODIUM 20 MG
TABLET ORAL
Qty: 90 TABLET | Refills: 0 | OUTPATIENT
Start: 2018-09-06

## 2018-09-12 ENCOUNTER — OFFICE VISIT (OUTPATIENT)
Dept: FAMILY MEDICINE CLINIC | Facility: CLINIC | Age: 83
End: 2018-09-12

## 2018-09-12 ENCOUNTER — LAB REQUISITION (OUTPATIENT)
Dept: LAB | Facility: HOSPITAL | Age: 83
End: 2018-09-12

## 2018-09-12 VITALS
DIASTOLIC BLOOD PRESSURE: 70 MMHG | WEIGHT: 204.4 LBS | HEART RATE: 72 BPM | HEIGHT: 68 IN | BODY MASS INDEX: 30.98 KG/M2 | OXYGEN SATURATION: 97 % | SYSTOLIC BLOOD PRESSURE: 124 MMHG

## 2018-09-12 DIAGNOSIS — E78.00 PURE HYPERCHOLESTEROLEMIA: Primary | ICD-10-CM

## 2018-09-12 DIAGNOSIS — Z00.00 ROUTINE GENERAL MEDICAL EXAMINATION AT A HEALTH CARE FACILITY: ICD-10-CM

## 2018-09-12 DIAGNOSIS — C61 PROSTATE CANCER (HCC): ICD-10-CM

## 2018-09-12 DIAGNOSIS — E03.4 HYPOTHYROIDISM DUE TO ACQUIRED ATROPHY OF THYROID: ICD-10-CM

## 2018-09-12 DIAGNOSIS — F33.42 RECURRENT MAJOR DEPRESSIVE DISORDER, IN FULL REMISSION (HCC): ICD-10-CM

## 2018-09-12 DIAGNOSIS — L98.9 SKIN LESION OF FACE: ICD-10-CM

## 2018-09-12 DIAGNOSIS — E11.9 TYPE 2 DIABETES MELLITUS WITHOUT COMPLICATION, WITHOUT LONG-TERM CURRENT USE OF INSULIN (HCC): ICD-10-CM

## 2018-09-12 DIAGNOSIS — I10 ESSENTIAL HYPERTENSION: ICD-10-CM

## 2018-09-12 PROCEDURE — 99214 OFFICE O/P EST MOD 30 MIN: CPT | Performed by: FAMILY MEDICINE

## 2018-09-12 PROCEDURE — 36415 COLL VENOUS BLD VENIPUNCTURE: CPT | Performed by: FAMILY MEDICINE

## 2018-09-13 LAB
ALBUMIN SERPL-MCNC: 4.32 G/DL (ref 3.2–4.8)
ALBUMIN/GLOB SERPL: 2.2 G/DL (ref 1.5–2.5)
ALP SERPL-CCNC: 50 U/L (ref 25–100)
ALT SERPL-CCNC: 14 U/L (ref 7–40)
AST SERPL-CCNC: 17 U/L (ref 0–33)
BASOPHILS # BLD AUTO: 0.03 10*3/MM3 (ref 0–0.2)
BASOPHILS NFR BLD AUTO: 0.6 % (ref 0–1)
BILIRUB SERPL-MCNC: 0.4 MG/DL (ref 0.3–1.2)
BUN SERPL-MCNC: 18 MG/DL (ref 9–23)
BUN/CREAT SERPL: 20 (ref 7–25)
CALCIUM SERPL-MCNC: 9.4 MG/DL (ref 8.7–10.4)
CHLORIDE SERPL-SCNC: 107 MMOL/L (ref 99–109)
CHOLEST SERPL-MCNC: 123 MG/DL (ref 0–200)
CO2 SERPL-SCNC: 26 MMOL/L (ref 20–31)
CREAT SERPL-MCNC: 0.9 MG/DL (ref 0.6–1.3)
DIFFERENTIAL COMMENT: NORMAL
EOSINOPHIL # BLD AUTO: 0.21 10*3/MM3 (ref 0–0.3)
EOSINOPHIL NFR BLD AUTO: 4.5 % (ref 0–3)
ERYTHROCYTE [DISTWIDTH] IN BLOOD BY AUTOMATED COUNT: 16 % (ref 11.3–14.5)
GLOBULIN SER CALC-MCNC: 2 GM/DL
GLUCOSE SERPL-MCNC: 109 MG/DL (ref 70–100)
HBA1C MFR BLD: 7.1 % (ref 4.8–5.6)
HCT VFR BLD AUTO: 37.8 % (ref 38.9–50.9)
HDLC SERPL-MCNC: 55 MG/DL (ref 40–60)
HGB BLD-MCNC: 12.1 G/DL (ref 13.1–17.5)
IMM GRANULOCYTES # BLD: 0.01 10*3/MM3 (ref 0–0.03)
IMM GRANULOCYTES NFR BLD: 0.2 % (ref 0–0.6)
LDLC SERPL CALC-MCNC: 47 MG/DL (ref 0–100)
LYMPHOCYTES # BLD AUTO: 1.29 10*3/MM3 (ref 0.6–4.8)
LYMPHOCYTES NFR BLD AUTO: 27.6 % (ref 24–44)
MCH RBC QN AUTO: 25.7 PG (ref 27–31)
MCHC RBC AUTO-ENTMCNC: 32 G/DL (ref 32–36)
MCV RBC AUTO: 80.4 FL (ref 80–99)
MONOCYTES # BLD AUTO: 0.52 10*3/MM3 (ref 0–1)
MONOCYTES NFR BLD AUTO: 11.1 % (ref 0–12)
NEUTROPHILS # BLD AUTO: 2.61 10*3/MM3 (ref 1.5–8.3)
NEUTROPHILS NFR BLD AUTO: 56 % (ref 41–71)
PLATELET # BLD AUTO: 166 10*3/MM3 (ref 150–450)
PLATELET BLD QL SMEAR: NORMAL
POTASSIUM SERPL-SCNC: 4.5 MMOL/L (ref 3.5–5.5)
PROT SERPL-MCNC: 6.3 G/DL (ref 5.7–8.2)
RBC # BLD AUTO: 4.7 10*6/MM3 (ref 4.2–5.76)
RBC MORPH BLD: NORMAL
SODIUM SERPL-SCNC: 139 MMOL/L (ref 132–146)
T4 FREE SERPL-MCNC: 1.45 NG/DL (ref 0.89–1.76)
TRIGL SERPL-MCNC: 106 MG/DL (ref 0–150)
TSH SERPL DL<=0.005 MIU/L-ACNC: 0.77 MIU/ML (ref 0.35–5.35)
VLDLC SERPL CALC-MCNC: 21.2 MG/DL
WBC # BLD AUTO: 4.67 10*3/MM3 (ref 3.5–10.8)

## 2018-10-15 ENCOUNTER — TELEPHONE (OUTPATIENT)
Dept: FAMILY MEDICINE CLINIC | Facility: CLINIC | Age: 83
End: 2018-10-15

## 2018-10-15 RX ORDER — PRAVASTATIN SODIUM 20 MG
20 TABLET ORAL DAILY
Qty: 90 TABLET | Refills: 1 | Status: SHIPPED | OUTPATIENT
Start: 2018-10-15 | End: 2019-04-18 | Stop reason: SDUPTHER

## 2018-10-15 NOTE — TELEPHONE ENCOUNTER
[facility] [phone] IS REQUESTING A REFILL OF PRAVASTATIN 20 MG X1 DAY. HE WAS FORMER PATIENT OF DR FRANCE. HE USUALLY GETS THE 90 DAY FILL.

## 2018-11-19 RX ORDER — LEVOTHYROXINE SODIUM 175 UG/1
TABLET ORAL
Qty: 90 TABLET | Refills: 0 | Status: SHIPPED | OUTPATIENT
Start: 2018-11-19 | End: 2019-02-22 | Stop reason: SDUPTHER

## 2018-11-26 ENCOUNTER — TELEPHONE (OUTPATIENT)
Dept: FAMILY MEDICINE CLINIC | Facility: CLINIC | Age: 83
End: 2018-11-26

## 2018-11-27 RX ORDER — LOSARTAN POTASSIUM 50 MG/1
50 TABLET ORAL EVERY MORNING
Qty: 90 TABLET | Refills: 0 | Status: SHIPPED | OUTPATIENT
Start: 2018-11-27 | End: 2019-03-12 | Stop reason: SDUPTHER

## 2019-02-22 RX ORDER — LEVOTHYROXINE SODIUM 175 UG/1
175 TABLET ORAL DAILY
Qty: 90 TABLET | Refills: 1 | Status: SHIPPED | OUTPATIENT
Start: 2019-02-22 | End: 2019-08-27 | Stop reason: SDUPTHER

## 2019-02-22 RX ORDER — OMEPRAZOLE 20 MG/1
CAPSULE, DELAYED RELEASE ORAL
Qty: 90 CAPSULE | Refills: 0 | Status: SHIPPED | OUTPATIENT
Start: 2019-02-22 | End: 2019-05-31 | Stop reason: SDUPTHER

## 2019-03-13 RX ORDER — LOSARTAN POTASSIUM 50 MG/1
TABLET ORAL
Qty: 90 TABLET | Refills: 0 | Status: SHIPPED | OUTPATIENT
Start: 2019-03-13 | End: 2019-05-31 | Stop reason: SDUPTHER

## 2019-03-18 ENCOUNTER — OFFICE VISIT (OUTPATIENT)
Dept: FAMILY MEDICINE CLINIC | Facility: CLINIC | Age: 84
End: 2019-03-18

## 2019-03-18 VITALS
BODY MASS INDEX: 31.22 KG/M2 | DIASTOLIC BLOOD PRESSURE: 64 MMHG | OXYGEN SATURATION: 98 % | HEART RATE: 73 BPM | WEIGHT: 206 LBS | HEIGHT: 68 IN | SYSTOLIC BLOOD PRESSURE: 140 MMHG

## 2019-03-18 DIAGNOSIS — J06.9 ACUTE URI: Primary | ICD-10-CM

## 2019-03-18 PROCEDURE — 99214 OFFICE O/P EST MOD 30 MIN: CPT | Performed by: FAMILY MEDICINE

## 2019-03-18 RX ORDER — AZITHROMYCIN 250 MG/1
TABLET, FILM COATED ORAL
Qty: 6 TABLET | Refills: 0 | Status: SHIPPED | OUTPATIENT
Start: 2019-03-18 | End: 2019-08-08

## 2019-03-18 RX ORDER — BENZONATATE 100 MG/1
100 CAPSULE ORAL 3 TIMES DAILY PRN
Qty: 30 CAPSULE | Refills: 0 | Status: SHIPPED | OUTPATIENT
Start: 2019-03-18 | End: 2019-03-28

## 2019-03-18 NOTE — PATIENT INSTRUCTIONS
1.  Start antibiotic today.     2.  Use Mucinex DM - one tablet at bedtime to suppress cough.     3.  Use benzonatate - as needed - to treat cough.

## 2019-03-18 NOTE — PROGRESS NOTES
Subjective   Vic Rahman is a 83 y.o. male.     URI    This is a new problem. The current episode started yesterday. The problem has been unchanged. There has been no fever. Associated symptoms include congestion and coughing. Pertinent negatives include no abdominal pain, chest pain, diarrhea, dysuria, ear pain, headaches, joint pain, joint swelling, nausea, neck pain, plugged ear sensation, rhinorrhea or sinus pain. He has tried nothing for the symptoms. The treatment provided no relief.       The following portions of the patient's history were reviewed and updated as appropriate: allergies, current medications, past family history, past medical history, past social history, past surgical history and problem list.    Review of Systems   HENT: Positive for congestion. Negative for ear pain, rhinorrhea and sinus pain.    Respiratory: Positive for cough.    Cardiovascular: Negative for chest pain.   Gastrointestinal: Negative for abdominal pain, diarrhea and nausea.   Genitourinary: Negative for dysuria.   Musculoskeletal: Negative for joint pain and neck pain.   Neurological: Negative for headaches.       Objective   Physical Exam   Constitutional: He appears well-developed and well-nourished.   HENT:   Head: Normocephalic and atraumatic.   Mouth/Throat: Oropharyngeal exudate present.   Cardiovascular: Normal rate, regular rhythm and normal heart sounds.   Pulmonary/Chest: Effort normal and breath sounds normal. No respiratory distress.   Cough noted   Nursing note and vitals reviewed.      Assessment/Plan   Vic was seen today for cough and nasal congestion.    Diagnoses and all orders for this visit:    Acute URI  -     benzonatate (TESSALON) 100 MG capsule; Take 1 capsule by mouth 3 (Three) Times a Day As Needed for Cough for up to 10 days.  -     azithromycin (ZITHROMAX Z-LEANN) 250 MG tablet; Take 2 tablets the first day, then 1 tablet daily for 4 days.

## 2019-04-18 RX ORDER — PRAVASTATIN SODIUM 20 MG
TABLET ORAL
Qty: 90 TABLET | Refills: 0 | Status: SHIPPED | OUTPATIENT
Start: 2019-04-18 | End: 2019-07-12 | Stop reason: SDUPTHER

## 2019-05-31 RX ORDER — LOSARTAN POTASSIUM 50 MG/1
TABLET ORAL
Qty: 90 TABLET | Refills: 0 | Status: SHIPPED | OUTPATIENT
Start: 2019-05-31 | End: 2019-09-05 | Stop reason: SDUPTHER

## 2019-05-31 RX ORDER — OMEPRAZOLE 20 MG/1
CAPSULE, DELAYED RELEASE ORAL
Qty: 90 CAPSULE | Refills: 0 | Status: SHIPPED | OUTPATIENT
Start: 2019-05-31 | End: 2019-09-10 | Stop reason: SDUPTHER

## 2019-07-12 RX ORDER — PRAVASTATIN SODIUM 20 MG
TABLET ORAL
Qty: 90 TABLET | Refills: 0 | Status: SHIPPED | OUTPATIENT
Start: 2019-07-12 | End: 2019-10-15 | Stop reason: SDUPTHER

## 2019-08-05 RX ORDER — OXAZEPAM 10 MG
CAPSULE ORAL
Qty: 60 CAPSULE | Refills: 0 | Status: SHIPPED | OUTPATIENT
Start: 2019-08-05 | End: 2020-08-12

## 2019-08-05 RX ORDER — METFORMIN HYDROCHLORIDE 500 MG/1
TABLET, EXTENDED RELEASE ORAL
Qty: 60 TABLET | Refills: 0 | Status: SHIPPED | OUTPATIENT
Start: 2019-08-05 | End: 2019-08-08 | Stop reason: SDUPTHER

## 2019-08-05 NOTE — TELEPHONE ENCOUNTER
Last fill:06/25/2018  Last office visit:03/18/2019  Next office visit:NONE   Date of last Leobardo:TODAY   CSA up-to-date? NO  Date of last UDS: NONE   UDS consistent:

## 2019-08-08 ENCOUNTER — OFFICE VISIT (OUTPATIENT)
Dept: FAMILY MEDICINE CLINIC | Facility: CLINIC | Age: 84
End: 2019-08-08

## 2019-08-08 VITALS
OXYGEN SATURATION: 98 % | WEIGHT: 203.2 LBS | HEART RATE: 72 BPM | SYSTOLIC BLOOD PRESSURE: 122 MMHG | DIASTOLIC BLOOD PRESSURE: 90 MMHG | HEIGHT: 68 IN | BODY MASS INDEX: 30.8 KG/M2

## 2019-08-08 DIAGNOSIS — E11.9 TYPE 2 DIABETES MELLITUS WITHOUT COMPLICATION, WITHOUT LONG-TERM CURRENT USE OF INSULIN (HCC): Primary | ICD-10-CM

## 2019-08-08 LAB — HBA1C MFR BLD: 6.1 %

## 2019-08-08 PROCEDURE — 99213 OFFICE O/P EST LOW 20 MIN: CPT | Performed by: FAMILY MEDICINE

## 2019-08-08 PROCEDURE — 83036 HEMOGLOBIN GLYCOSYLATED A1C: CPT | Performed by: FAMILY MEDICINE

## 2019-08-08 RX ORDER — METFORMIN HYDROCHLORIDE 500 MG/1
1000 TABLET, EXTENDED RELEASE ORAL 2 TIMES DAILY
Qty: 360 TABLET | Refills: 1 | Status: SHIPPED | OUTPATIENT
Start: 2019-08-08 | End: 2020-02-18
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
## 2019-08-27 RX ORDER — LEVOTHYROXINE SODIUM 175 UG/1
TABLET ORAL
Qty: 90 TABLET | Refills: 0 | Status: SHIPPED | OUTPATIENT
Start: 2019-08-27 | End: 2019-11-29 | Stop reason: SDUPTHER

## 2019-09-05 RX ORDER — LOSARTAN POTASSIUM 50 MG/1
TABLET ORAL
Qty: 90 TABLET | Refills: 0 | Status: SHIPPED | OUTPATIENT
Start: 2019-09-05 | End: 2019-12-12 | Stop reason: SDUPTHER

## 2019-09-10 RX ORDER — OMEPRAZOLE 20 MG/1
CAPSULE, DELAYED RELEASE ORAL
Qty: 90 CAPSULE | Refills: 0 | Status: SHIPPED | OUTPATIENT
Start: 2019-09-10 | End: 2019-12-20

## 2019-10-15 ENCOUNTER — TELEPHONE (OUTPATIENT)
Dept: FAMILY MEDICINE CLINIC | Facility: CLINIC | Age: 84
End: 2019-10-15

## 2019-10-15 RX ORDER — PRAVASTATIN SODIUM 20 MG
20 TABLET ORAL DAILY
Qty: 90 TABLET | Refills: 1 | Status: SHIPPED | OUTPATIENT
Start: 2019-10-15 | End: 2020-04-10

## 2019-11-12 ENCOUNTER — LAB REQUISITION (OUTPATIENT)
Dept: LAB | Facility: HOSPITAL | Age: 84
End: 2019-11-12

## 2019-11-12 DIAGNOSIS — Z00.00 ROUTINE GENERAL MEDICAL EXAMINATION AT A HEALTH CARE FACILITY: ICD-10-CM

## 2019-11-12 LAB — PSA SERPL-MCNC: 0.09 NG/ML (ref 0–4)

## 2019-11-12 PROCEDURE — 84153 ASSAY OF PSA TOTAL: CPT | Performed by: UROLOGY

## 2019-12-02 RX ORDER — LEVOTHYROXINE SODIUM 175 UG/1
TABLET ORAL
Qty: 90 TABLET | Refills: 0 | Status: SHIPPED | OUTPATIENT
Start: 2019-12-02 | End: 2020-02-26 | Stop reason: SDUPTHER

## 2019-12-12 RX ORDER — LOSARTAN POTASSIUM 50 MG/1
TABLET ORAL
Qty: 90 TABLET | Refills: 0 | Status: SHIPPED | OUTPATIENT
Start: 2019-12-12 | End: 2020-03-16

## 2019-12-20 RX ORDER — OMEPRAZOLE 20 MG/1
CAPSULE, DELAYED RELEASE ORAL
Qty: 90 CAPSULE | Refills: 0 | Status: SHIPPED | OUTPATIENT
Start: 2019-12-20 | End: 2020-03-16

## 2020-02-12 ENCOUNTER — LAB REQUISITION (OUTPATIENT)
Dept: LAB | Facility: HOSPITAL | Age: 85
End: 2020-02-12

## 2020-02-12 ENCOUNTER — OFFICE VISIT (OUTPATIENT)
Dept: FAMILY MEDICINE CLINIC | Facility: CLINIC | Age: 85
End: 2020-02-12

## 2020-02-12 VITALS
HEART RATE: 69 BPM | HEIGHT: 68 IN | BODY MASS INDEX: 30.01 KG/M2 | WEIGHT: 198 LBS | OXYGEN SATURATION: 97 % | SYSTOLIC BLOOD PRESSURE: 110 MMHG | DIASTOLIC BLOOD PRESSURE: 70 MMHG

## 2020-02-12 DIAGNOSIS — I44.1 MOBITZ TYPE 1 SECOND DEGREE AV BLOCK: ICD-10-CM

## 2020-02-12 DIAGNOSIS — Z01.810 PREOPERATIVE CARDIOVASCULAR EXAMINATION: Primary | ICD-10-CM

## 2020-02-12 DIAGNOSIS — Z00.00 ROUTINE GENERAL MEDICAL EXAMINATION AT A HEALTH CARE FACILITY: ICD-10-CM

## 2020-02-12 DIAGNOSIS — E11.9 TYPE 2 DIABETES MELLITUS WITHOUT COMPLICATION, WITHOUT LONG-TERM CURRENT USE OF INSULIN (HCC): ICD-10-CM

## 2020-02-12 DIAGNOSIS — I10 ESSENTIAL HYPERTENSION: ICD-10-CM

## 2020-02-12 DIAGNOSIS — R94.31 ABNORMAL EKG: ICD-10-CM

## 2020-02-12 DIAGNOSIS — E03.4 HYPOTHYROIDISM DUE TO ACQUIRED ATROPHY OF THYROID: ICD-10-CM

## 2020-02-12 DIAGNOSIS — E78.00 PURE HYPERCHOLESTEROLEMIA: ICD-10-CM

## 2020-02-12 PROCEDURE — 99214 OFFICE O/P EST MOD 30 MIN: CPT | Performed by: FAMILY MEDICINE

## 2020-02-12 PROCEDURE — 36415 COLL VENOUS BLD VENIPUNCTURE: CPT | Performed by: FAMILY MEDICINE

## 2020-02-12 PROCEDURE — 93000 ELECTROCARDIOGRAM COMPLETE: CPT | Performed by: FAMILY MEDICINE

## 2020-02-12 RX ORDER — VIT A/VIT C/VIT E/ZINC/COPPER 4296-226
CAPSULE ORAL
COMMUNITY
End: 2022-09-12

## 2020-02-12 NOTE — PATIENT INSTRUCTIONS
1.  Labs today    2.  New EKG findings need further investigation and cardiology referral    3.  You have been referred for specialized imaging and/or specialist office consultation today.  You will be contacted by our referral coordinator or the specialist's office to schedule your appointment over the next 2 to 3 business days.  Please make sure to check your voicemail, and ensure that we have the correct phone number on file for you.  Sometimes, based on insurance requirements, referrals may take longer to schedule. However if you have not heard from anyone within 7 days, please call the office to check on your referral status.      Second-Degree Atrioventricular Block  Second-degree atrioventricular (AV) block is a condition that can cause the heart to miss beats. In this condition, the electrical signals that travel from the heart’s upper chambers (atria) to its lower chambers (ventricles) move too slowly or are interrupted. There are two types of second-degree AV block:  · Mobitz type 1 block. This does not cause symptoms. It rarely requires treatment.  · Mobitz type 2 block. This is more serious. It results in more missed beats and a very irregular heartbeat. It can lead to complete heart block, meaning that no signal exists between the upper and lower chambers of the heart. This is a dangerous condition that can lead to fainting or cardiac arrest.  What are the causes?  This condition may be caused by:  · Any condition that damages the system that controls the heart’s rate and rhythm, such as a heart attack or infection of the heart.  · Overstimulation of the nerve that slows down heart rate (vagus nerve). This cause is common among well-conditioned athletes.  · Some medicines that slow down the heart rate, such as beta blockers or calcium channel blockers.  · Surgery that damages the heart.  Some people are born with this condition (congenital heart block), but most people develop it over time.  What  increases the risk?  The risk for this condition increases with age. You are also more likely to develop this condition if you have:  · A history of heart attack.  · Heart failure.  · Coronary heart disease.  · Inflammation of heart muscle (myocarditis).  · Disease of heart muscle (cardiomyopathy).  · Infection of the heart valves (endocarditis).  · Infections or diseases that affect the heart. These include:  ? Lyme disease.  ? Sarcoidosis.  ? Hemochromatosis.  ? Rheumatic fever.  ? Certain muscle disorders.  Babies are more likely to be born with heart block if:  · The child's mother has an autoimmune disease, such as lupus.  · The baby is born with a heart defect that affects the heart’s structure.  · A parent was born with a heart defect.  What are the signs or symptoms?  Symptoms of this condition include:  · Tiredness.  · Shortness of breath.  · Dizziness.  · Lightheadedness.  · Fainting.  · Chest pain.  How is this diagnosed?  This condition may be diagnosed based on:  · A physical exam.  · Your medical history.  · A measurement of your pulse or heartbeat.  · Tests. These may include:  ? An electrocardiogram (ECG). This checks for problems with electrical activity in your heart.  ? An ambulatory cardiac monitor or event monitor test. This portable device monitors your heart rate over time.  ? An electrophysiology (EP) study. Long, thin tubes (catheters) are placed in your heart. The catheters give information about your heart's electrical signals.  How is this treated?  Treatment for this condition depends on the type of block you have and how severe it is. Treatment may involve:  · Treating an underlying condition, such as heart disease.  · Changing or stopping any heart medicines that may have caused heart block.  · Having a permanent pacemaker placed in your chest. A pacemaker uses electrical pulses to help the heart beat normally. It is usually placed under the skin on your chest or abdomen. You may need  a pacemaker if you have Mobitz type 2 block.  Follow these instructions at home:  General instructions         · Take over-the-counter and prescription medicines only as told by your health care provider.  · Follow your health care provider's recommendations to help reduce your risk for heart disease. These may include:  ? Exercising for at least 30 minutes 5 or more days (150 minutes) each week. Ask your health care provider what type of exercise is safe for you.  ? Eating a heart-healthy diet with fruits and vegetables, whole grains, low-fat dairy products, and lean proteins like poultry and eggs. Your health care provider or dietitian can help you make healthy choices.  ? Maintaining a healthy weight.  · Do not use any products that contain nicotine or tobacco, such as cigarettes and e-cigarettes. If you need help quitting, ask your health care provider.  · Keep all follow-up visits as told by your health care provider. This is important.  Alcohol use  · Do not drink alcohol if:  ? Your health care provider tells you not to drink.  ? You are pregnant, may be pregnant, or are planning to become pregnant.  · If you drink alcohol, limit how much you have:  ? 0-1 drink a day for women.  ? 0-2 drinks a day for men.  § Be aware of how much alcohol is in your drink. In the [place] one drink equals one typical bottle of beer (12 oz), one-half glass of wine (5 oz), or one shot of hard liquor (1½ oz).  Contact a health care provider if you:  · Feel like your heart is skipping beats.  · Feel more tired than normal.  · Have swelling in your lower legs or your feet.  Get help right away if you:  · Have symptoms that change or get worse.  · Develop new symptoms.  · Have chest pain, especially if the pain:  ? Feels like crushing or pressure.  ? Spreads to your arms, back, neck, or jaw.  · Feel short of breath.  · Feel light-headed or weak.  · Faint.  These symptoms may represent a serious problem that is an emergency. Do not wait  to see if the symptoms will go away. Get medical help right away. Call your local emergency services (911 in the [place] Do not drive yourself to the hospital.  Summary  · Second-degree AV block is a type of heart block that can cause the heart to miss beats. In this condition, the electrical signals that control heart rate move too slowly or are interrupted.  · You may need a pacemaker if you have the more serious type of AV block (Mobitz type 2 block).  · Get help right away if you have chest pain, if you faint, or if you develop new symptoms.  This information is not intended to replace advice given to you by your health care provider. Make sure you discuss any questions you have with your health care provider.  Document Released: 11/30/2009 Document Revised: 01/29/2019 Document Reviewed: 01/29/2019  [employer] Interactive Patient Education © 2019 [employer]
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
## 2020-02-13 LAB
BUN SERPL-MCNC: 13 MG/DL (ref 8–23)
BUN/CREAT SERPL: 14.3 (ref 7–25)
CALCIUM SERPL-MCNC: 9.3 MG/DL (ref 8.6–10.5)
CHLORIDE SERPL-SCNC: 102 MMOL/L (ref 98–107)
CO2 SERPL-SCNC: 24.3 MMOL/L (ref 22–29)
CREAT SERPL-MCNC: 0.91 MG/DL (ref 0.76–1.27)
CRP SERPL HS-MCNC: 0.96 MG/L (ref 0–3)
ERYTHROCYTE [DISTWIDTH] IN BLOOD BY AUTOMATED COUNT: 13 % (ref 12.3–15.4)
GLUCOSE SERPL-MCNC: 112 MG/DL (ref 65–99)
HBA1C MFR BLD: 6.5 % (ref 4.8–5.6)
HCT VFR BLD AUTO: 39.8 % (ref 37.5–51)
HGB BLD-MCNC: 13.5 G/DL (ref 13–17.7)
MAGNESIUM SERPL-MCNC: 1.8 MG/DL (ref 1.6–2.4)
MCH RBC QN AUTO: 29.9 PG (ref 26.6–33)
MCHC RBC AUTO-ENTMCNC: 33.9 G/DL (ref 31.5–35.7)
MCV RBC AUTO: 88.2 FL (ref 79–97)
PLATELET # BLD AUTO: 156 10*3/MM3 (ref 140–450)
POTASSIUM SERPL-SCNC: 5 MMOL/L (ref 3.5–5.2)
RBC # BLD AUTO: 4.51 10*6/MM3 (ref 4.14–5.8)
SODIUM SERPL-SCNC: 141 MMOL/L (ref 136–145)
TSH SERPL DL<=0.005 MIU/L-ACNC: 0.32 UIU/ML (ref 0.27–4.2)
WBC # BLD AUTO: 5.51 10*3/MM3 (ref 3.4–10.8)

## 2020-02-18 ENCOUNTER — HOSPITAL ENCOUNTER (OUTPATIENT)
Dept: CARDIOLOGY | Facility: HOSPITAL | Age: 85
Discharge: HOME | End: 2020-02-18

## 2020-02-18 ENCOUNTER — HOSPITAL ENCOUNTER (OUTPATIENT)
Dept: CARDIOLOGY | Facility: HOSPITAL | Age: 85
Discharge: HOME | End: 2020-02-18
Admitting: FAMILY MEDICINE

## 2020-02-18 VITALS
SYSTOLIC BLOOD PRESSURE: 140 MMHG | BODY MASS INDEX: 30.01 KG/M2 | HEIGHT: 68 IN | DIASTOLIC BLOOD PRESSURE: 84 MMHG | WEIGHT: 198 LBS | HEART RATE: 63 BPM

## 2020-02-18 DIAGNOSIS — R94.31 ABNORMAL EKG: ICD-10-CM

## 2020-02-18 DIAGNOSIS — E11.9 TYPE 2 DIABETES MELLITUS WITHOUT COMPLICATION, WITHOUT LONG-TERM CURRENT USE OF INSULIN (HCC): ICD-10-CM

## 2020-02-18 DIAGNOSIS — I44.1 MOBITZ TYPE 1 SECOND DEGREE AV BLOCK: ICD-10-CM

## 2020-02-18 LAB
BH CV STRESS BP STAGE 2: NORMAL
BH CV STRESS BP STAGE 3: NORMAL
BH CV STRESS DOSE DOBUTAMINE STAGE 1: 10
BH CV STRESS DOSE DOBUTAMINE STAGE 2: 20
BH CV STRESS DOSE DOBUTAMINE STAGE 3: 30
BH CV STRESS DURATION MIN STAGE 1: 2
BH CV STRESS DURATION MIN STAGE 2: 2
BH CV STRESS DURATION MIN STAGE 3: 1
BH CV STRESS DURATION SEC STAGE 1: 0
BH CV STRESS DURATION SEC STAGE 2: 0
BH CV STRESS DURATION SEC STAGE 3: 19
BH CV STRESS GRADE STAGE 1: 10
BH CV STRESS HR STAGE 1: 67
BH CV STRESS HR STAGE 2: 106
BH CV STRESS HR STAGE 3: 120
BH CV STRESS METS STAGE 1: 5
BH CV STRESS PROTOCOL 1: NORMAL
BH CV STRESS RATE STAGE 1: 54
BH CV STRESS RATE STAGE 2: 108
BH CV STRESS RATE STAGE 3: 162
BH CV STRESS RECOVERY BP: NORMAL MMHG
BH CV STRESS RECOVERY HR: 76 BPM
BH CV STRESS SPEED STAGE 1: 1.7
BH CV STRESS STAGE 1: 1
BH CV STRESS STAGE 2: 2
BH CV STRESS STAGE 3: 3
LV EF NUC BP: 89 %
MAXIMAL PREDICTED HEART RATE: 137 BPM
PERCENT MAX PREDICTED HR: 88.32 %
STRESS BASELINE BP: NORMAL MMHG
STRESS BASELINE HR: 56 BPM
STRESS PERCENT HR: 104 %
STRESS POST EXERCISE DUR MIN: 5 MIN
STRESS POST EXERCISE DUR SEC: 19 SEC
STRESS POST PEAK BP: NORMAL MMHG
STRESS POST PEAK HR: 121 BPM
STRESS TARGET HR: 116 BPM

## 2020-02-18 PROCEDURE — A9500 TC99M SESTAMIBI: HCPCS | Performed by: FAMILY MEDICINE

## 2020-02-18 PROCEDURE — 0 TECHNETIUM SESTAMIBI: Performed by: FAMILY MEDICINE

## 2020-02-18 PROCEDURE — 78452 HT MUSCLE IMAGE SPECT MULT: CPT

## 2020-02-18 PROCEDURE — 93017 CV STRESS TEST TRACING ONLY: CPT

## 2020-02-18 PROCEDURE — 93018 CV STRESS TEST I&R ONLY: CPT | Performed by: INTERNAL MEDICINE

## 2020-02-18 PROCEDURE — 78452 HT MUSCLE IMAGE SPECT MULT: CPT | Performed by: INTERNAL MEDICINE

## 2020-02-18 PROCEDURE — 25010000002 DOBUTAMINE PER 250 MG: Performed by: FAMILY MEDICINE

## 2020-02-18 RX ORDER — DOBUTAMINE HYDROCHLORIDE 100 MG/100ML
10 INJECTION INTRAVENOUS
Status: DISCONTINUED | OUTPATIENT
Start: 2020-02-18 | End: 2020-02-19 | Stop reason: HOSPADM

## 2020-02-18 RX ORDER — METFORMIN HYDROCHLORIDE 500 MG/1
TABLET, EXTENDED RELEASE ORAL
Qty: 360 TABLET | Refills: 0 | Status: SHIPPED | OUTPATIENT
Start: 2020-02-18 | End: 2020-05-26

## 2020-02-18 RX ADMIN — DOBUTAMINE HYDROCHLORIDE 10 MCG/KG/MIN: 100 INJECTION INTRAVENOUS at 10:02

## 2020-02-18 RX ADMIN — TECHNETIUM TC 99M SESTAMIBI 1 DOSE: 1 INJECTION INTRAVENOUS at 07:35

## 2020-02-18 RX ADMIN — TECHNETIUM TC 99M SESTAMIBI 1 DOSE: 1 INJECTION INTRAVENOUS at 09:45

## 2020-02-20 ENCOUNTER — HOSPITAL ENCOUNTER (OUTPATIENT)
Dept: CARDIOLOGY | Facility: HOSPITAL | Age: 85
Discharge: HOME | End: 2020-02-20

## 2020-02-20 ENCOUNTER — OFFICE VISIT (OUTPATIENT)
Dept: CARDIOLOGY | Facility: HOSPITAL | Age: 85
End: 2020-02-20

## 2020-02-20 VITALS
TEMPERATURE: 96.5 F | WEIGHT: 206.25 LBS | OXYGEN SATURATION: 97 % | HEIGHT: 68 IN | BODY MASS INDEX: 31.26 KG/M2 | DIASTOLIC BLOOD PRESSURE: 74 MMHG | HEART RATE: 70 BPM | RESPIRATION RATE: 18 BRPM | SYSTOLIC BLOOD PRESSURE: 142 MMHG

## 2020-02-20 DIAGNOSIS — I44.1 MOBITZ TYPE 1 SECOND DEGREE AV BLOCK: Primary | ICD-10-CM

## 2020-02-20 DIAGNOSIS — R06.09 DYSPNEA ON EXERTION: ICD-10-CM

## 2020-02-20 DIAGNOSIS — I44.1 MOBITZ TYPE 1 SECOND DEGREE AV BLOCK: ICD-10-CM

## 2020-02-20 DIAGNOSIS — I05.9 MITRAL VALVE DISEASE: ICD-10-CM

## 2020-02-20 DIAGNOSIS — R53.83 OTHER FATIGUE: ICD-10-CM

## 2020-02-20 DIAGNOSIS — I10 ESSENTIAL HYPERTENSION: ICD-10-CM

## 2020-02-20 PROCEDURE — 99214 OFFICE O/P EST MOD 30 MIN: CPT | Performed by: NURSE PRACTITIONER
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
## 2020-02-24 ENCOUNTER — DOCUMENTATION (OUTPATIENT)
Dept: CARDIOLOGY | Facility: HOSPITAL | Age: 85
End: 2020-02-24

## 2020-02-24 NOTE — PROGRESS NOTES
Patient called and stated that his monitor strip came off while he was sleeping and he said that he put another strip on. He checked the device and it was functioning properly. I told him to change the strip out next Monday for the weekly change but if he  was to use another strip before then he needed to call me so we could get him some ordered through [employer]. He had no further questions at this time and was able to verbalize instructions back.

## 2020-02-26 ENCOUNTER — OFFICE VISIT (OUTPATIENT)
Dept: FAMILY MEDICINE CLINIC | Facility: CLINIC | Age: 85
End: 2020-02-26

## 2020-02-26 VITALS
WEIGHT: 201 LBS | DIASTOLIC BLOOD PRESSURE: 54 MMHG | HEART RATE: 75 BPM | HEIGHT: 68 IN | SYSTOLIC BLOOD PRESSURE: 130 MMHG | BODY MASS INDEX: 30.46 KG/M2 | OXYGEN SATURATION: 99 %

## 2020-02-26 DIAGNOSIS — E11.9 TYPE 2 DIABETES MELLITUS WITHOUT COMPLICATION, WITHOUT LONG-TERM CURRENT USE OF INSULIN (HCC): Primary | ICD-10-CM

## 2020-02-26 DIAGNOSIS — I10 ESSENTIAL HYPERTENSION: ICD-10-CM

## 2020-02-26 DIAGNOSIS — I44.1 MOBITZ TYPE 1 SECOND DEGREE AV BLOCK: ICD-10-CM

## 2020-02-26 DIAGNOSIS — E03.4 HYPOTHYROIDISM DUE TO ACQUIRED ATROPHY OF THYROID: ICD-10-CM

## 2020-02-26 PROCEDURE — 99214 OFFICE O/P EST MOD 30 MIN: CPT | Performed by: FAMILY MEDICINE

## 2020-02-26 RX ORDER — LEVOTHYROXINE SODIUM 0.15 MG/1
150 TABLET ORAL DAILY
Qty: 90 TABLET | Refills: 1 | Status: SHIPPED | OUTPATIENT
Start: 2020-02-26 | End: 2020-09-01
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
## 2020-02-27 ENCOUNTER — TELEPHONE (OUTPATIENT)
Dept: FAMILY MEDICINE CLINIC | Facility: CLINIC | Age: 85
End: 2020-02-27

## 2020-02-28 ENCOUNTER — HOSPITAL ENCOUNTER (OUTPATIENT)
Dept: CARDIOLOGY | Facility: HOSPITAL | Age: 85
Discharge: HOME | End: 2020-02-28
Admitting: NURSE PRACTITIONER

## 2020-02-28 VITALS — HEIGHT: 67 IN | WEIGHT: 201 LBS | BODY MASS INDEX: 31.55 KG/M2

## 2020-02-28 DIAGNOSIS — I05.9 MITRAL VALVE DISEASE: ICD-10-CM

## 2020-02-28 DIAGNOSIS — I10 ESSENTIAL HYPERTENSION: ICD-10-CM

## 2020-02-28 DIAGNOSIS — R06.09 DYSPNEA ON EXERTION: ICD-10-CM

## 2020-02-28 LAB
ASCENDING AORTA: 3.3 CM
BH CV ECHO MEAS - AO MAX PG (FULL): 1.1 MMHG
BH CV ECHO MEAS - AO MAX PG: 6 MMHG
BH CV ECHO MEAS - AO MEAN PG (FULL): 1 MMHG
BH CV ECHO MEAS - AO MEAN PG: 3 MMHG
BH CV ECHO MEAS - AO ROOT AREA (BSA CORRECTED): 1.8
BH CV ECHO MEAS - AO ROOT AREA: 10.8 CM^2
BH CV ECHO MEAS - AO ROOT DIAM: 3.7 CM
BH CV ECHO MEAS - AO V2 MAX: 126 CM/SEC
BH CV ECHO MEAS - AO V2 MEAN: 80.4 CM/SEC
BH CV ECHO MEAS - AO V2 VTI: 26.1 CM
BH CV ECHO MEAS - ASC AORTA: 3.3 CM
BH CV ECHO MEAS - AVA(I,A): 3.3 CM^2
BH CV ECHO MEAS - AVA(I,D): 3.3 CM^2
BH CV ECHO MEAS - AVA(V,A): 3.3 CM^2
BH CV ECHO MEAS - AVA(V,D): 3.3 CM^2
BH CV ECHO MEAS - BSA(HAYCOCK): 2.1 M^2
BH CV ECHO MEAS - BSA: 2 M^2
BH CV ECHO MEAS - BZI_BMI: 31.5 KILOGRAMS/M^2
BH CV ECHO MEAS - BZI_METRIC_HEIGHT: 170.2 CM
BH CV ECHO MEAS - BZI_METRIC_WEIGHT: 91.2 KG
BH CV ECHO MEAS - EDV(CUBED): 131.1 ML
BH CV ECHO MEAS - EDV(MOD-SP2): 56 ML
BH CV ECHO MEAS - EDV(MOD-SP4): 65 ML
BH CV ECHO MEAS - EDV(TEICH): 122.7 ML
BH CV ECHO MEAS - EF(CUBED): 66.2 %
BH CV ECHO MEAS - EF(MOD-BP): 67 %
BH CV ECHO MEAS - EF(MOD-SP2): 64.3 %
BH CV ECHO MEAS - EF(MOD-SP4): 69.2 %
BH CV ECHO MEAS - EF(TEICH): 57.4 %
BH CV ECHO MEAS - ESV(CUBED): 44.4 ML
BH CV ECHO MEAS - ESV(MOD-SP2): 20 ML
BH CV ECHO MEAS - ESV(MOD-SP4): 20 ML
BH CV ECHO MEAS - ESV(TEICH): 52.3 ML
BH CV ECHO MEAS - FS: 30.3 %
BH CV ECHO MEAS - IVS/LVPW: 1.1
BH CV ECHO MEAS - IVSD: 0.9 CM
BH CV ECHO MEAS - LA DIMENSION: 3.4 CM
BH CV ECHO MEAS - LA/AO: 0.92
BH CV ECHO MEAS - LAD MAJOR: 6.1 CM
BH CV ECHO MEAS - LAT PEAK E' VEL: 11.1 CM/SEC
BH CV ECHO MEAS - LATERAL E/E' RATIO: 7.2
BH CV ECHO MEAS - LV DIASTOLIC VOL/BSA (35-75): 32.1 ML/M^2
BH CV ECHO MEAS - LV MASS(C)D: 152 GRAMS
BH CV ECHO MEAS - LV MASS(C)DI: 75 GRAMS/M^2
BH CV ECHO MEAS - LV MAX PG: 4.9 MMHG
BH CV ECHO MEAS - LV MEAN PG: 2 MMHG
BH CV ECHO MEAS - LV SYSTOLIC VOL/BSA (12-30): 9.9 ML/M^2
BH CV ECHO MEAS - LV V1 MAX: 111 CM/SEC
BH CV ECHO MEAS - LV V1 MEAN: 72.4 CM/SEC
BH CV ECHO MEAS - LV V1 VTI: 22.4 CM
BH CV ECHO MEAS - LVIDD: 5.1 CM
BH CV ECHO MEAS - LVIDS: 3.5 CM
BH CV ECHO MEAS - LVLD AP2: 7.8 CM
BH CV ECHO MEAS - LVLD AP4: 8.2 CM
BH CV ECHO MEAS - LVLS AP2: 6.1 CM
BH CV ECHO MEAS - LVLS AP4: 6.1 CM
BH CV ECHO MEAS - LVOT AREA (M): 3.8 CM^2
BH CV ECHO MEAS - LVOT AREA: 3.8 CM^2
BH CV ECHO MEAS - LVOT DIAM: 2.2 CM
BH CV ECHO MEAS - LVPWD: 0.82 CM
BH CV ECHO MEAS - MED PEAK E' VEL: 9.1 CM/SEC
BH CV ECHO MEAS - MEDIAL E/E' RATIO: 8.7
BH CV ECHO MEAS - MV A MAX VEL: 81.4 CM/SEC
BH CV ECHO MEAS - MV DEC TIME: 0.3 SEC
BH CV ECHO MEAS - MV E MAX VEL: 79.5 CM/SEC
BH CV ECHO MEAS - MV E/A: 0.98
BH CV ECHO MEAS - MV MAX PG: 4 MMHG
BH CV ECHO MEAS - MV MEAN PG: 2 MMHG
BH CV ECHO MEAS - MV V2 MAX: 99.6 CM/SEC
BH CV ECHO MEAS - MV V2 MEAN: 68.9 CM/SEC
BH CV ECHO MEAS - MV V2 VTI: 28.9 CM
BH CV ECHO MEAS - MVA(VTI): 2.9 CM^2
BH CV ECHO MEAS - PA ACC SLOPE: 468 CM/SEC^2
BH CV ECHO MEAS - PA ACC TIME: 0.17 SEC
BH CV ECHO MEAS - PA MAX PG: 5.7 MMHG
BH CV ECHO MEAS - PA PR(ACCEL): 4.8 MMHG
BH CV ECHO MEAS - PA V2 MAX: 119 CM/SEC
BH CV ECHO MEAS - PI END-D VEL: 110 CM/SEC
BH CV ECHO MEAS - RAP SYSTOLE: 8 MMHG
BH CV ECHO MEAS - RVSP: 34 MMHG
BH CV ECHO MEAS - SI(AO): 138.5 ML/M^2
BH CV ECHO MEAS - SI(CUBED): 42.8 ML/M^2
BH CV ECHO MEAS - SI(LVOT): 42 ML/M^2
BH CV ECHO MEAS - SI(MOD-SP2): 17.8 ML/M^2
BH CV ECHO MEAS - SI(MOD-SP4): 22.2 ML/M^2
BH CV ECHO MEAS - SI(TEICH): 34.7 ML/M^2
BH CV ECHO MEAS - SV(AO): 280.6 ML
BH CV ECHO MEAS - SV(CUBED): 86.7 ML
BH CV ECHO MEAS - SV(LVOT): 85.1 ML
BH CV ECHO MEAS - SV(MOD-SP2): 36 ML
BH CV ECHO MEAS - SV(MOD-SP4): 45 ML
BH CV ECHO MEAS - SV(TEICH): 70.4 ML
BH CV ECHO MEAS - TAPSE (>1.6): 2 CM2
BH CV ECHO MEAS - TR MAX PG: 26 MMHG
BH CV ECHO MEAS - TR MAX VEL: 257 CM/SEC
BH CV ECHO MEASUREMENTS AVERAGE E/E' RATIO: 7.87
BH CV VAS BP RIGHT ARM: NORMAL MMHG
BH CV XLRA - RV BASE: 3 CM
BH CV XLRA - RV LENGTH: 6.3 CM
BH CV XLRA - RV MID: 2.3 CM
BH CV XLRA - TDI S': 12.7 CM/SEC
LEFT ATRIUM VOLUME INDEX: 23.7 ML/M^2
LEFT ATRIUM VOLUME: 48 ML

## 2020-02-28 PROCEDURE — 93306 TTE W/DOPPLER COMPLETE: CPT | Performed by: INTERNAL MEDICINE

## 2020-02-28 PROCEDURE — 93306 TTE W/DOPPLER COMPLETE: CPT

## 2020-03-05 ENCOUNTER — OFFICE VISIT (OUTPATIENT)
Dept: FAMILY MEDICINE CLINIC | Facility: CLINIC | Age: 85
End: 2020-03-05

## 2020-03-05 ENCOUNTER — CONSULT (OUTPATIENT)
Dept: CARDIOLOGY | Facility: CLINIC | Age: 85
End: 2020-03-05

## 2020-03-05 ENCOUNTER — TELEPHONE (OUTPATIENT)
Dept: CARDIOLOGY | Facility: HOSPITAL | Age: 85
End: 2020-03-05

## 2020-03-05 VITALS
HEIGHT: 68 IN | HEART RATE: 101 BPM | WEIGHT: 203 LBS | DIASTOLIC BLOOD PRESSURE: 80 MMHG | SYSTOLIC BLOOD PRESSURE: 134 MMHG | OXYGEN SATURATION: 98 % | BODY MASS INDEX: 30.77 KG/M2

## 2020-03-05 VITALS
OXYGEN SATURATION: 97 % | WEIGHT: 197 LBS | BODY MASS INDEX: 29.86 KG/M2 | HEIGHT: 68 IN | DIASTOLIC BLOOD PRESSURE: 66 MMHG | HEART RATE: 92 BPM | SYSTOLIC BLOOD PRESSURE: 142 MMHG

## 2020-03-05 DIAGNOSIS — E03.4 HYPOTHYROIDISM DUE TO ACQUIRED ATROPHY OF THYROID: Primary | ICD-10-CM

## 2020-03-05 DIAGNOSIS — I44.1 MOBITZ TYPE 1 SECOND DEGREE AV BLOCK: ICD-10-CM

## 2020-03-05 DIAGNOSIS — I49.1 PAC (PREMATURE ATRIAL CONTRACTION): Primary | ICD-10-CM

## 2020-03-05 PROCEDURE — 99203 OFFICE O/P NEW LOW 30 MIN: CPT | Performed by: PHYSICIAN ASSISTANT

## 2020-03-05 PROCEDURE — 93000 ELECTROCARDIOGRAM COMPLETE: CPT | Performed by: PHYSICIAN ASSISTANT

## 2020-03-05 PROCEDURE — 99213 OFFICE O/P EST LOW 20 MIN: CPT | Performed by: FAMILY MEDICINE
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
## 2020-03-05 NOTE — PATIENT INSTRUCTIONS
1.  Ok to proceed with surgery as planned unless instructed otherwise after monitor results are returned.    2.  Follow-up as scheduled.
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
## 2020-03-10 PROCEDURE — 93228 REMOTE 30 DAY ECG REV/REPORT: CPT | Performed by: INTERNAL MEDICINE

## 2020-03-16 RX ORDER — OMEPRAZOLE 20 MG/1
CAPSULE, DELAYED RELEASE ORAL
Qty: 90 CAPSULE | Refills: 0 | Status: SHIPPED | OUTPATIENT
Start: 2020-03-16 | End: 2020-06-29

## 2020-03-16 RX ORDER — LOSARTAN POTASSIUM 50 MG/1
TABLET ORAL
Qty: 90 TABLET | Refills: 0 | Status: SHIPPED | OUTPATIENT
Start: 2020-03-16 | End: 2020-06-15

## 2020-03-17 ENCOUNTER — OUTSIDE FACILITY SERVICE (OUTPATIENT)
Dept: FAMILY MEDICINE CLINIC | Facility: CLINIC | Age: 85
End: 2020-03-17

## 2020-03-24 PROCEDURE — G0180 MD CERTIFICATION HHA PATIENT: HCPCS | Performed by: FAMILY MEDICINE

## 2020-04-10 RX ORDER — PRAVASTATIN SODIUM 20 MG
TABLET ORAL
Qty: 90 TABLET | Refills: 0 | Status: SHIPPED | OUTPATIENT
Start: 2020-04-10 | End: 2020-07-13

## 2020-05-25 DIAGNOSIS — E11.9 TYPE 2 DIABETES MELLITUS WITHOUT COMPLICATION, WITHOUT LONG-TERM CURRENT USE OF INSULIN (HCC): ICD-10-CM

## 2020-05-26 RX ORDER — METFORMIN HYDROCHLORIDE 500 MG/1
TABLET, EXTENDED RELEASE ORAL
Qty: 360 TABLET | Refills: 0 | Status: SHIPPED | OUTPATIENT
Start: 2020-05-26 | End: 2020-08-12

## 2020-06-15 RX ORDER — LOSARTAN POTASSIUM 50 MG/1
TABLET ORAL
Qty: 90 TABLET | Refills: 0 | Status: SHIPPED | OUTPATIENT
Start: 2020-06-15 | End: 2020-09-14

## 2020-06-29 RX ORDER — OMEPRAZOLE 20 MG/1
CAPSULE, DELAYED RELEASE ORAL
Qty: 90 CAPSULE | Refills: 0 | Status: SHIPPED | OUTPATIENT
Start: 2020-06-29 | End: 2020-09-28

## 2020-07-13 RX ORDER — PRAVASTATIN SODIUM 20 MG
TABLET ORAL
Qty: 90 TABLET | Refills: 0 | Status: SHIPPED | OUTPATIENT
Start: 2020-07-13 | End: 2020-10-09

## 2020-08-12 ENCOUNTER — OFFICE VISIT (OUTPATIENT)
Dept: FAMILY MEDICINE CLINIC | Facility: CLINIC | Age: 85
End: 2020-08-12

## 2020-08-12 VITALS
WEIGHT: 188 LBS | DIASTOLIC BLOOD PRESSURE: 80 MMHG | SYSTOLIC BLOOD PRESSURE: 134 MMHG | BODY MASS INDEX: 28.49 KG/M2 | OXYGEN SATURATION: 98 % | HEART RATE: 77 BPM | HEIGHT: 68 IN

## 2020-08-12 DIAGNOSIS — Z00.00 PREVENTATIVE HEALTH CARE: ICD-10-CM

## 2020-08-12 DIAGNOSIS — Z00.00 MEDICARE ANNUAL WELLNESS VISIT, SUBSEQUENT: Primary | ICD-10-CM

## 2020-08-12 DIAGNOSIS — E11.9 TYPE 2 DIABETES MELLITUS WITHOUT COMPLICATION, WITHOUT LONG-TERM CURRENT USE OF INSULIN (HCC): ICD-10-CM

## 2020-08-12 DIAGNOSIS — E03.4 HYPOTHYROIDISM DUE TO ACQUIRED ATROPHY OF THYROID: ICD-10-CM

## 2020-08-12 DIAGNOSIS — I10 ESSENTIAL HYPERTENSION: ICD-10-CM

## 2020-08-12 LAB — HBA1C MFR BLD: 5.8 %

## 2020-08-12 PROCEDURE — G0439 PPPS, SUBSEQ VISIT: HCPCS | Performed by: FAMILY MEDICINE

## 2020-08-12 PROCEDURE — 96160 PT-FOCUSED HLTH RISK ASSMT: CPT | Performed by: FAMILY MEDICINE

## 2020-08-12 PROCEDURE — G0442 ANNUAL ALCOHOL SCREEN 15 MIN: HCPCS | Performed by: FAMILY MEDICINE

## 2020-08-12 PROCEDURE — G0444 DEPRESSION SCREEN ANNUAL: HCPCS | Performed by: FAMILY MEDICINE

## 2020-08-12 PROCEDURE — 99397 PER PM REEVAL EST PAT 65+ YR: CPT | Performed by: FAMILY MEDICINE

## 2020-08-12 PROCEDURE — 83036 HEMOGLOBIN GLYCOSYLATED A1C: CPT | Performed by: FAMILY MEDICINE
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
## 2020-08-12 NOTE — PATIENT INSTRUCTIONS
1.  Continue medications and supplements - as listed.  -Ok to stop metformin and we will recheck your A1c in the Spring    2.  Continue well-balanced diet - low in calories and low in added sugar.  -Plant-based diets have the best evidence for decreasing inflammation and chronic pain, as well as reducing the risk of heart disease and dementia.    3.  Maintain a routine exercise program - 30 minutes at least 3 days every week.  This is important even if you are active at your job.    4.  A letter will be sent with your test results or they will be made available via [employer].  If you have not heard about your results within 10 days for chronic and routine labs, or 48 hours for acute labs, please call the office.    5.  If you would like to have your labs completed prior to your next visit to be discussed at your appointment, please call the office 1 to 2 weeks before your scheduled visit to request that lab orders be placed.

## 2020-08-13 ENCOUNTER — LAB (OUTPATIENT)
Dept: LAB | Facility: HOSPITAL | Age: 85
End: 2020-08-13

## 2020-08-13 DIAGNOSIS — E03.4 HYPOTHYROIDISM DUE TO ACQUIRED ATROPHY OF THYROID: ICD-10-CM

## 2020-08-13 DIAGNOSIS — I10 ESSENTIAL HYPERTENSION: ICD-10-CM

## 2020-08-13 DIAGNOSIS — E11.9 TYPE 2 DIABETES MELLITUS WITHOUT COMPLICATION, WITHOUT LONG-TERM CURRENT USE OF INSULIN (HCC): ICD-10-CM

## 2020-08-14 LAB
ALBUMIN SERPL-MCNC: 4.3 G/DL (ref 3.5–5.2)
ALBUMIN/GLOB SERPL: 2.5 G/DL
ALP SERPL-CCNC: 44 U/L (ref 39–117)
ALT SERPL-CCNC: 11 U/L (ref 1–41)
AST SERPL-CCNC: 14 U/L (ref 1–40)
BILIRUB SERPL-MCNC: 0.4 MG/DL (ref 0–1.2)
BUN SERPL-MCNC: 12 MG/DL (ref 8–23)
BUN/CREAT SERPL: 13.5 (ref 7–25)
CALCIUM SERPL-MCNC: 9.3 MG/DL (ref 8.6–10.5)
CHLORIDE SERPL-SCNC: 103 MMOL/L (ref 98–107)
CHOLEST SERPL-MCNC: 119 MG/DL (ref 0–200)
CO2 SERPL-SCNC: 26.5 MMOL/L (ref 22–29)
CREAT SERPL-MCNC: 0.89 MG/DL (ref 0.76–1.27)
GLOBULIN SER CALC-MCNC: 1.7 GM/DL
GLUCOSE SERPL-MCNC: 108 MG/DL (ref 65–99)
HDLC SERPL-MCNC: 61 MG/DL (ref 40–60)
LDLC SERPL CALC-MCNC: 38 MG/DL (ref 0–100)
POTASSIUM SERPL-SCNC: 4.7 MMOL/L (ref 3.5–5.2)
PROT SERPL-MCNC: 6 G/DL (ref 6–8.5)
SODIUM SERPL-SCNC: 139 MMOL/L (ref 136–145)
T4 FREE SERPL-MCNC: 1.59 NG/DL (ref 0.93–1.7)
TRIGL SERPL-MCNC: 98 MG/DL (ref 0–150)
TSH SERPL DL<=0.005 MIU/L-ACNC: 0.4 UIU/ML (ref 0.27–4.2)
VLDLC SERPL CALC-MCNC: 19.6 MG/DL

## 2020-09-01 DIAGNOSIS — E03.4 HYPOTHYROIDISM DUE TO ACQUIRED ATROPHY OF THYROID: ICD-10-CM

## 2020-09-01 RX ORDER — LEVOTHYROXINE SODIUM 0.15 MG/1
TABLET ORAL
Qty: 90 TABLET | Refills: 1 | Status: SHIPPED | OUTPATIENT
Start: 2020-09-01 | End: 2021-03-04

## 2020-09-14 RX ORDER — LOSARTAN POTASSIUM 50 MG/1
TABLET ORAL
Qty: 90 TABLET | Refills: 0 | Status: SHIPPED | OUTPATIENT
Start: 2020-09-14 | End: 2020-12-15

## 2020-09-28 RX ORDER — OMEPRAZOLE 20 MG/1
CAPSULE, DELAYED RELEASE ORAL
Qty: 90 CAPSULE | Refills: 0 | Status: SHIPPED | OUTPATIENT
Start: 2020-09-28 | End: 2020-12-28

## 2020-10-09 RX ORDER — PRAVASTATIN SODIUM 20 MG
TABLET ORAL
Qty: 90 TABLET | Refills: 3 | Status: SHIPPED | OUTPATIENT
Start: 2020-10-09 | End: 2021-10-08

## 2020-12-15 RX ORDER — LOSARTAN POTASSIUM 50 MG/1
TABLET ORAL
Qty: 90 TABLET | Refills: 0 | Status: SHIPPED | OUTPATIENT
Start: 2020-12-15 | End: 2021-03-16

## 2020-12-28 RX ORDER — OMEPRAZOLE 20 MG/1
CAPSULE, DELAYED RELEASE ORAL
Qty: 90 CAPSULE | Refills: 0 | Status: SHIPPED | OUTPATIENT
Start: 2020-12-28 | End: 2021-03-30

## 2021-03-04 DIAGNOSIS — E03.4 HYPOTHYROIDISM DUE TO ACQUIRED ATROPHY OF THYROID: ICD-10-CM

## 2021-03-04 RX ORDER — LEVOTHYROXINE SODIUM 0.15 MG/1
TABLET ORAL
Qty: 90 TABLET | Refills: 0 | Status: SHIPPED | OUTPATIENT
Start: 2021-03-04 | End: 2021-06-03 | Stop reason: SDUPTHER

## 2021-03-16 RX ORDER — LOSARTAN POTASSIUM 50 MG/1
TABLET ORAL
Qty: 90 TABLET | Refills: 0 | Status: SHIPPED | OUTPATIENT
Start: 2021-03-16 | End: 2021-06-14

## 2021-03-30 RX ORDER — OMEPRAZOLE 20 MG/1
CAPSULE, DELAYED RELEASE ORAL
Qty: 90 CAPSULE | Refills: 0 | Status: SHIPPED | OUTPATIENT
Start: 2021-03-30 | End: 2021-03-31 | Stop reason: SDUPTHER

## 2021-03-31 RX ORDER — OMEPRAZOLE 20 MG/1
20 CAPSULE, DELAYED RELEASE ORAL DAILY
Qty: 90 CAPSULE | Refills: 0 | Status: SHIPPED | OUTPATIENT
Start: 2021-03-31 | End: 2021-09-30

## 2021-05-12 ENCOUNTER — OFFICE VISIT (OUTPATIENT)
Dept: FAMILY MEDICINE CLINIC | Facility: CLINIC | Age: 86
End: 2021-05-12

## 2021-05-12 VITALS
DIASTOLIC BLOOD PRESSURE: 72 MMHG | BODY MASS INDEX: 31.43 KG/M2 | HEART RATE: 69 BPM | SYSTOLIC BLOOD PRESSURE: 132 MMHG | OXYGEN SATURATION: 98 % | HEIGHT: 68 IN | WEIGHT: 207.4 LBS

## 2021-05-12 DIAGNOSIS — I10 ESSENTIAL HYPERTENSION: ICD-10-CM

## 2021-05-12 DIAGNOSIS — E78.00 PURE HYPERCHOLESTEROLEMIA: ICD-10-CM

## 2021-05-12 DIAGNOSIS — E03.4 HYPOTHYROIDISM DUE TO ACQUIRED ATROPHY OF THYROID: ICD-10-CM

## 2021-05-12 DIAGNOSIS — E11.9 TYPE 2 DIABETES MELLITUS WITHOUT COMPLICATION, WITHOUT LONG-TERM CURRENT USE OF INSULIN (HCC): Primary | ICD-10-CM

## 2021-05-12 LAB
ALBUMIN SERPL-MCNC: 4.4 G/DL (ref 3.5–5.2)
ALBUMIN/GLOB SERPL: 2.2 G/DL
ALP SERPL-CCNC: 59 U/L (ref 39–117)
ALT SERPL-CCNC: 14 U/L (ref 1–41)
AST SERPL-CCNC: 15 U/L (ref 1–40)
BILIRUB SERPL-MCNC: 0.3 MG/DL (ref 0–1.2)
BUN SERPL-MCNC: 13 MG/DL (ref 8–23)
BUN/CREAT SERPL: 13.5 (ref 7–25)
CALCIUM SERPL-MCNC: 9.6 MG/DL (ref 8.6–10.5)
CHLORIDE SERPL-SCNC: 104 MMOL/L (ref 98–107)
CHOLEST SERPL-MCNC: 124 MG/DL (ref 0–200)
CO2 SERPL-SCNC: 27 MMOL/L (ref 22–29)
CREAT SERPL-MCNC: 0.96 MG/DL (ref 0.76–1.27)
GLOBULIN SER CALC-MCNC: 2 GM/DL
GLUCOSE SERPL-MCNC: 150 MG/DL (ref 65–99)
HDLC SERPL-MCNC: 63 MG/DL (ref 40–60)
LDLC SERPL CALC-MCNC: 45 MG/DL (ref 0–100)
POTASSIUM SERPL-SCNC: 4.5 MMOL/L (ref 3.5–5.2)
PROT SERPL-MCNC: 6.4 G/DL (ref 6–8.5)
SODIUM SERPL-SCNC: 140 MMOL/L (ref 136–145)
TRIGL SERPL-MCNC: 84 MG/DL (ref 0–150)
TSH SERPL DL<=0.005 MIU/L-ACNC: 1.27 UIU/ML (ref 0.27–4.2)
VLDLC SERPL CALC-MCNC: 16 MG/DL (ref 5–40)

## 2021-05-12 PROCEDURE — 99214 OFFICE O/P EST MOD 30 MIN: CPT | Performed by: FAMILY MEDICINE

## 2021-05-12 NOTE — PATIENT INSTRUCTIONS
1.  Obtain and use [employer] compression gloves for arthritis    2.  Work on decreasing sweets in diet    3.  Continue to see Dr. Sutton for injections every 6 months
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
## 2021-06-03 DIAGNOSIS — E03.4 HYPOTHYROIDISM DUE TO ACQUIRED ATROPHY OF THYROID: ICD-10-CM

## 2021-06-03 NOTE — TELEPHONE ENCOUNTER
[facility] [address] [place] [address] [address] AT [facility] [phone] [facility] [phone] FX    Relationship: Pharmacy    Best call back number: [phone]    Medication needed:   Requested Prescriptions     Pending Prescriptions Disp Refills   • levothyroxine (SYNTHROID, LEVOTHROID) 150 MCG tablet 90 tablet 0     Sig: Take 1 tablet by mouth Daily.       When do you need the refill by: ASAP    What additional details did the patient provide when requesting the medication: PATIENT NEEDS BEFORE THE WEEKEND     Does the patient have less than a 3 day supply:  [x] Yes  [] No    What is the patient's preferred pharmacy: [address] [facility] [address] AT [facility] [phone] [facility] [phone] FX

## 2021-06-04 RX ORDER — LEVOTHYROXINE SODIUM 0.15 MG/1
150 TABLET ORAL DAILY
Qty: 90 TABLET | Refills: 0 | Status: SHIPPED | OUTPATIENT
Start: 2021-06-04 | End: 2021-09-08

## 2021-06-14 RX ORDER — LOSARTAN POTASSIUM 50 MG/1
TABLET ORAL
Qty: 90 TABLET | Refills: 0 | Status: SHIPPED | OUTPATIENT
Start: 2021-06-14 | End: 2021-09-20

## 2021-09-08 DIAGNOSIS — E03.4 HYPOTHYROIDISM DUE TO ACQUIRED ATROPHY OF THYROID: ICD-10-CM

## 2021-09-08 RX ORDER — LEVOTHYROXINE SODIUM 0.15 MG/1
TABLET ORAL
Qty: 90 TABLET | Refills: 0 | Status: SHIPPED | OUTPATIENT
Start: 2021-09-08 | End: 2021-12-03

## 2021-09-08 NOTE — TELEPHONE ENCOUNTER
Rx Refill Note  Requested Prescriptions     Pending Prescriptions Disp Refills   • levothyroxine (SYNTHROID, LEVOTHROID) 150 MCG tablet [Pharmacy Med Name: LEVOTHYROXINE 150 MCG TABLET] 90 tablet 0     Sig: TAKE ONE TABLET BY MOUTH DAILY      Last office visit with prescribing clinician: 5/12/2021      Next office visit with prescribing clinician: 11/18/2021            Grecia Winters MA  09/08/21, 12:53 EDT

## 2021-09-20 RX ORDER — LOSARTAN POTASSIUM 50 MG/1
TABLET ORAL
Qty: 90 TABLET | Refills: 0 | Status: SHIPPED | OUTPATIENT
Start: 2021-09-20 | End: 2021-11-18 | Stop reason: SDUPTHER

## 2021-09-20 NOTE — TELEPHONE ENCOUNTER
Rx Refill Note  Requested Prescriptions     Pending Prescriptions Disp Refills   • losartan (COZAAR) 50 MG tablet [Pharmacy Med Name: LOSARTAN POTASSIUM 50 MG TAB] 90 tablet 0     Sig: TAKE ONE TABLET BY MOUTH EVERY MORNING      Last office visit with prescribing clinician: 5/12/2021      Next office visit with prescribing clinician: 11/18/2021            Eleno Hamilton MA  09/20/21, 08:30 EDT

## 2021-09-30 RX ORDER — OMEPRAZOLE 20 MG/1
CAPSULE, DELAYED RELEASE ORAL
Qty: 90 CAPSULE | Refills: 0 | Status: SHIPPED | OUTPATIENT
Start: 2021-09-30 | End: 2022-01-04

## 2021-09-30 NOTE — TELEPHONE ENCOUNTER
Rx Refill Note  Requested Prescriptions     Pending Prescriptions Disp Refills   • omeprazole (priLOSEC) 20 MG capsule [Pharmacy Med Name: OMEPRAZOLE DR 20 MG CAPSULE] 90 capsule 0     Sig: TAKE ONE CAPSULE BY MOUTH DAILY      Last office visit with prescribing clinician: 5/12/2021      Next office visit with prescribing clinician: 11/18/2021            Brandi Thompson MA  09/30/21, 08:52 EDT

## 2021-10-08 RX ORDER — PRAVASTATIN SODIUM 20 MG
TABLET ORAL
Qty: 90 TABLET | Refills: 3 | Status: SHIPPED | OUTPATIENT
Start: 2021-10-08 | End: 2022-09-28

## 2021-10-08 NOTE — TELEPHONE ENCOUNTER
Rx Refill Note  Requested Prescriptions     Pending Prescriptions Disp Refills   • pravastatin (PRAVACHOL) 20 MG tablet [Pharmacy Med Name: PRAVASTATIN SODIUM 20 MG TAB] 90 tablet 3     Sig: TAKE ONE TABLET BY MOUTH DAILY      Last office visit with prescribing clinician: 5/12/2021      Next office visit with prescribing clinician: 11/18/2021            Grecia Winters MA  10/08/21, 10:41 EDT

## 2021-11-18 ENCOUNTER — LAB (OUTPATIENT)
Dept: LAB | Facility: HOSPITAL | Age: 86
End: 2021-11-18

## 2021-11-18 ENCOUNTER — OFFICE VISIT (OUTPATIENT)
Dept: FAMILY MEDICINE CLINIC | Facility: CLINIC | Age: 86
End: 2021-11-18

## 2021-11-18 VITALS
WEIGHT: 214 LBS | HEART RATE: 87 BPM | OXYGEN SATURATION: 97 % | BODY MASS INDEX: 32.43 KG/M2 | RESPIRATION RATE: 16 BRPM | TEMPERATURE: 97.1 F | SYSTOLIC BLOOD PRESSURE: 110 MMHG | DIASTOLIC BLOOD PRESSURE: 70 MMHG | HEIGHT: 68 IN

## 2021-11-18 DIAGNOSIS — I10 PRIMARY HYPERTENSION: ICD-10-CM

## 2021-11-18 DIAGNOSIS — E03.4 HYPOTHYROIDISM DUE TO ACQUIRED ATROPHY OF THYROID: ICD-10-CM

## 2021-11-18 DIAGNOSIS — Z13.0 SCREENING FOR DEFICIENCY ANEMIA: ICD-10-CM

## 2021-11-18 DIAGNOSIS — E78.00 PURE HYPERCHOLESTEROLEMIA: ICD-10-CM

## 2021-11-18 DIAGNOSIS — E11.9 TYPE 2 DIABETES MELLITUS WITHOUT COMPLICATION, WITHOUT LONG-TERM CURRENT USE OF INSULIN (HCC): ICD-10-CM

## 2021-11-18 DIAGNOSIS — Z00.00 MEDICARE ANNUAL WELLNESS VISIT, SUBSEQUENT: Primary | ICD-10-CM

## 2021-11-18 DIAGNOSIS — Z00.00 PREVENTATIVE HEALTH CARE: ICD-10-CM

## 2021-11-18 LAB
ALBUMIN SERPL-MCNC: 4.3 G/DL (ref 3.5–5.2)
ALBUMIN/GLOB SERPL: 2.2 G/DL
ALP SERPL-CCNC: 66 U/L (ref 39–117)
ALT SERPL-CCNC: 13 U/L (ref 1–41)
AST SERPL-CCNC: 15 U/L (ref 1–40)
BILIRUB SERPL-MCNC: 0.3 MG/DL (ref 0–1.2)
BUN SERPL-MCNC: 16 MG/DL (ref 8–23)
BUN/CREAT SERPL: 16.5 (ref 7–25)
CALCIUM SERPL-MCNC: 9.2 MG/DL (ref 8.6–10.5)
CHLORIDE SERPL-SCNC: 104 MMOL/L (ref 98–107)
CHOLEST SERPL-MCNC: 127 MG/DL (ref 0–200)
CO2 SERPL-SCNC: 25 MMOL/L (ref 22–29)
CREAT SERPL-MCNC: 0.97 MG/DL (ref 0.76–1.27)
ERYTHROCYTE [DISTWIDTH] IN BLOOD BY AUTOMATED COUNT: 13.2 % (ref 12.3–15.4)
EXPIRATION DATE: NORMAL
GLOBULIN SER CALC-MCNC: 2 GM/DL
GLUCOSE SERPL-MCNC: 199 MG/DL (ref 65–99)
HBA1C MFR BLD: 6.9 %
HCT VFR BLD AUTO: 42 % (ref 37.5–51)
HDLC SERPL-MCNC: 57 MG/DL (ref 40–60)
HGB BLD-MCNC: 13.6 G/DL (ref 13–17.7)
LDLC SERPL CALC-MCNC: 43 MG/DL (ref 0–100)
Lab: NORMAL
MCH RBC QN AUTO: 29.2 PG (ref 26.6–33)
MCHC RBC AUTO-ENTMCNC: 32.4 G/DL (ref 31.5–35.7)
MCV RBC AUTO: 90.3 FL (ref 79–97)
PLATELET # BLD AUTO: 137 10*3/MM3 (ref 140–450)
POTASSIUM SERPL-SCNC: 4.7 MMOL/L (ref 3.5–5.2)
PROT SERPL-MCNC: 6.3 G/DL (ref 6–8.5)
RBC # BLD AUTO: 4.65 10*6/MM3 (ref 4.14–5.8)
SODIUM SERPL-SCNC: 142 MMOL/L (ref 136–145)
TRIGL SERPL-MCNC: 164 MG/DL (ref 0–150)
TSH SERPL DL<=0.005 MIU/L-ACNC: 1.19 UIU/ML (ref 0.27–4.2)
VLDLC SERPL CALC-MCNC: 27 MG/DL (ref 5–40)
WBC # BLD AUTO: 4.78 10*3/MM3 (ref 3.4–10.8)

## 2021-11-18 PROCEDURE — 83036 HEMOGLOBIN GLYCOSYLATED A1C: CPT | Performed by: FAMILY MEDICINE

## 2021-11-18 PROCEDURE — 3044F HG A1C LEVEL LT 7.0%: CPT | Performed by: FAMILY MEDICINE

## 2021-11-18 PROCEDURE — 96160 PT-FOCUSED HLTH RISK ASSMT: CPT | Performed by: FAMILY MEDICINE

## 2021-11-18 PROCEDURE — G0444 DEPRESSION SCREEN ANNUAL: HCPCS | Performed by: FAMILY MEDICINE

## 2021-11-18 PROCEDURE — 1170F FXNL STATUS ASSESSED: CPT | Performed by: FAMILY MEDICINE

## 2021-11-18 PROCEDURE — 1160F RVW MEDS BY RX/DR IN RCRD: CPT | Performed by: FAMILY MEDICINE

## 2021-11-18 PROCEDURE — 99397 PER PM REEVAL EST PAT 65+ YR: CPT | Performed by: FAMILY MEDICINE

## 2021-11-18 PROCEDURE — G0439 PPPS, SUBSEQ VISIT: HCPCS | Performed by: FAMILY MEDICINE

## 2021-11-18 PROCEDURE — 1126F AMNT PAIN NOTED NONE PRSNT: CPT | Performed by: FAMILY MEDICINE

## 2021-11-18 RX ORDER — LOSARTAN POTASSIUM 50 MG/1
50 TABLET ORAL EVERY MORNING
Qty: 90 TABLET | Refills: 0 | Status: SHIPPED | OUTPATIENT
Start: 2021-11-18 | End: 2022-03-11 | Stop reason: SDUPTHER

## 2021-11-18 NOTE — PATIENT INSTRUCTIONS
Medicare Wellness  Personal Prevention Plan of Service     Date of Office Visit:  2021  Encounter Provider:  Taqueria Casarez DO  Place of Service:  [facility]  Patient Name: Vic Rahman  :  1935    As part of the Medicare Wellness portion of your visit today, we are providing you with this personalized preventive plan of services (PPPS). This plan is based upon recommendations of the [place] Preventive Services Task Force (USPSTF) and the Advisory Committee on Immunization Practices (ACIP).    This lists the preventive care services that should be considered, and provides dates of when you are due. Items listed as completed are up-to-date and do not require any further intervention.    Health Maintenance   Topic Date Due   • URINE MICROALBUMIN  Never done   • ANNUAL WELLNESS VISIT  2021   • DIABETIC EYE EXAM  2022   • COVID-19 Vaccine (3 - Booster for Pfizer series) 2022   • LIPID PANEL  2022   • HEMOGLOBIN A1C  2022   • TDAP/TD VACCINES (4 - Td or Tdap) 2023   • INFLUENZA VACCINE  Completed   • Pneumococcal Vaccine 65+  Completed   • ZOSTER VACCINE  Completed       Orders Placed This Encounter   Procedures   • POC Glycosylated Hemoglobin (Hb A1C)     Order Specific Question:   Release to patient     Answer:   Immediate       Return in about 4 months (around 3/18/2022) for with A1c;.

## 2021-11-18 NOTE — ASSESSMENT & PLAN NOTE
A1c up to 6.9% today. Still acceptable for his age, but I did recommend that he cut down on his sweets a little. This will help both his weight and energy level.

## 2021-11-18 NOTE — ASSESSMENT & PLAN NOTE
Clinically stable although has shown some weight gain.  Changed dosage from 175 to 150 over the last 1.5 years. Will recheck TSH today.

## 2021-11-19 LAB
ALBUMIN/CREAT UR: 5 MG/G CREAT (ref 0–29)
APPEARANCE UR: CLEAR
BILIRUB UR QL STRIP: NEGATIVE
COLOR UR: YELLOW
CREAT UR-MCNC: 151.2 MG/DL
GLUCOSE UR QL: NEGATIVE
HGB UR QL STRIP: NEGATIVE
KETONES UR QL STRIP: ABNORMAL
LEUKOCYTE ESTERASE UR QL STRIP: NEGATIVE
MICROALBUMIN UR-MCNC: 7.5 UG/ML
NITRITE UR QL STRIP: NEGATIVE
PH UR STRIP: 6 [PH] (ref 5–8)
PROT UR QL STRIP: ABNORMAL
SP GR UR: 1.02 (ref 1–1.03)
UROBILINOGEN UR STRIP-MCNC: ABNORMAL MG/DL

## 2021-12-03 DIAGNOSIS — E03.4 HYPOTHYROIDISM DUE TO ACQUIRED ATROPHY OF THYROID: ICD-10-CM

## 2021-12-03 RX ORDER — LEVOTHYROXINE SODIUM 0.15 MG/1
TABLET ORAL
Qty: 90 TABLET | Refills: 0 | Status: SHIPPED | OUTPATIENT
Start: 2021-12-03 | End: 2022-03-07

## 2021-12-09 ENCOUNTER — TELEPHONE (OUTPATIENT)
Dept: FAMILY MEDICINE CLINIC | Facility: CLINIC | Age: 86
End: 2021-12-09

## 2021-12-09 NOTE — TELEPHONE ENCOUNTER
Caller: Vic Rahman    Relationship: Self    Best call back number: [phone]    What is the best time to reach you:  ANYTIME    Who are you requesting to speak with (clinical staff, provider,  specific staff member): CLINICAL    What was the call regarding: PATIENT WOULD LIKE TO KNOW IF HE SUPPOSED TO BE TAKING METFORMIN    Do you require a callback: YES

## 2022-01-04 RX ORDER — OMEPRAZOLE 20 MG/1
CAPSULE, DELAYED RELEASE ORAL
Qty: 90 CAPSULE | Refills: 0 | Status: SHIPPED | OUTPATIENT
Start: 2022-01-04 | End: 2022-04-05

## 2022-01-04 NOTE — TELEPHONE ENCOUNTER
Rx Refill Note  Requested Prescriptions     Pending Prescriptions Disp Refills   • omeprazole (priLOSEC) 20 MG capsule [Pharmacy Med Name: OMEPRAZOLE DR 20 MG CAPSULE] 90 capsule 0     Sig: TAKE ONE CAPSULE BY MOUTH DAILY      Last office visit with prescribing clinician: 11/18/2021      Next office visit with prescribing clinician: 3/18/2022     Yola Cano MA  01/04/22, 12:12 EST     Last fill: 09/30/2021

## 2022-03-04 ENCOUNTER — TELEPHONE (OUTPATIENT)
Dept: FAMILY MEDICINE CLINIC | Facility: CLINIC | Age: 87
End: 2022-03-04

## 2022-03-07 DIAGNOSIS — E03.4 HYPOTHYROIDISM DUE TO ACQUIRED ATROPHY OF THYROID: ICD-10-CM

## 2022-03-07 RX ORDER — LEVOTHYROXINE SODIUM 0.15 MG/1
TABLET ORAL
Qty: 90 TABLET | Refills: 0 | Status: SHIPPED | OUTPATIENT
Start: 2022-03-07 | End: 2022-06-08

## 2022-03-07 NOTE — TELEPHONE ENCOUNTER
Rx Refill Note  Requested Prescriptions     Pending Prescriptions Disp Refills   • levothyroxine (SYNTHROID, LEVOTHROID) 150 MCG tablet [Pharmacy Med Name: LEVOTHYROXINE 150 MCG TABLET] 90 tablet 0     Sig: TAKE ONE TABLET BY MOUTH DAILY      Last office visit with prescribing clinician: 11/18/2021      Next office visit with prescribing clinician: 3/14/2022   {Jeanie Esquivel  03/07/22, 13:24 EST     Last Fill:12/3/21

## 2022-03-14 ENCOUNTER — OFFICE VISIT (OUTPATIENT)
Dept: FAMILY MEDICINE CLINIC | Facility: CLINIC | Age: 87
End: 2022-03-14

## 2022-03-14 ENCOUNTER — LAB (OUTPATIENT)
Dept: LAB | Facility: HOSPITAL | Age: 87
End: 2022-03-14

## 2022-03-14 VITALS
SYSTOLIC BLOOD PRESSURE: 126 MMHG | HEART RATE: 70 BPM | RESPIRATION RATE: 16 BRPM | DIASTOLIC BLOOD PRESSURE: 66 MMHG | BODY MASS INDEX: 33.98 KG/M2 | WEIGHT: 224.2 LBS | HEIGHT: 68 IN | OXYGEN SATURATION: 95 %

## 2022-03-14 DIAGNOSIS — I10 PRIMARY HYPERTENSION: ICD-10-CM

## 2022-03-14 DIAGNOSIS — D69.6 THROMBOCYTOPENIA: ICD-10-CM

## 2022-03-14 DIAGNOSIS — E11.9 TYPE 2 DIABETES MELLITUS WITHOUT COMPLICATION, WITHOUT LONG-TERM CURRENT USE OF INSULIN: Primary | ICD-10-CM

## 2022-03-14 LAB
EXPIRATION DATE: NORMAL
HBA1C MFR BLD: 7.1 %
Lab: NORMAL

## 2022-03-14 PROCEDURE — 3051F HG A1C>EQUAL 7.0%<8.0%: CPT | Performed by: FAMILY MEDICINE

## 2022-03-14 PROCEDURE — 83036 HEMOGLOBIN GLYCOSYLATED A1C: CPT | Performed by: FAMILY MEDICINE

## 2022-03-14 PROCEDURE — 99213 OFFICE O/P EST LOW 20 MIN: CPT | Performed by: FAMILY MEDICINE

## 2022-03-14 RX ORDER — LOSARTAN POTASSIUM 50 MG/1
50 TABLET ORAL EVERY MORNING
Qty: 90 TABLET | Refills: 3 | Status: SHIPPED | OUTPATIENT
Start: 2022-03-14 | End: 2023-03-14

## 2022-03-14 NOTE — ASSESSMENT & PLAN NOTE
- No medications, A1c today 7.1 percent, which is up from his last check, but still very much at goal for his age at 86 years old. Continue low sugar diet.
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
## 2022-03-15 LAB
BASOPHILS # BLD AUTO: NORMAL 10*3/UL
DIFFERENTIAL COMMENT: NORMAL
EOSINOPHIL # BLD AUTO: NORMAL 10*3/UL
EOSINOPHIL NFR BLD AUTO: NORMAL %
ERYTHROCYTE [DISTWIDTH] IN BLOOD BY AUTOMATED COUNT: 12.7 % (ref 12.3–15.4)
HCT VFR BLD AUTO: 42.4 % (ref 37.5–51)
HGB BLD-MCNC: 14 G/DL (ref 13–17.7)
LYMPHOCYTES # BLD AUTO: NORMAL 10*3/UL
LYMPHOCYTES # BLD MANUAL: 1.23 10*3/MM3 (ref 0.7–3.1)
LYMPHOCYTES NFR BLD AUTO: NORMAL %
LYMPHOCYTES NFR BLD MANUAL: 29.3 % (ref 19.6–45.3)
MCH RBC QN AUTO: 29.5 PG (ref 26.6–33)
MCHC RBC AUTO-ENTMCNC: 33 G/DL (ref 31.5–35.7)
MCV RBC AUTO: 89.3 FL (ref 79–97)
MONOCYTES # BLD MANUAL: 0.21 10*3/MM3 (ref 0.1–0.9)
MONOCYTES NFR BLD AUTO: NORMAL %
MONOCYTES NFR BLD MANUAL: 5.1 % (ref 5–12)
NEUTROPHILS # BLD MANUAL: 2.76 10*3/MM3 (ref 1.7–7)
NEUTROPHILS NFR BLD AUTO: NORMAL %
NEUTROPHILS NFR BLD MANUAL: 65.7 % (ref 42.7–76)
PLATELET # BLD AUTO: 147 10*3/MM3 (ref 140–450)
PLATELET BLD QL SMEAR: NORMAL
RBC # BLD AUTO: 4.75 10*6/MM3 (ref 4.14–5.8)
RBC MORPH BLD: NORMAL
WBC # BLD AUTO: 4.2 10*3/MM3 (ref 3.4–10.8)

## 2022-04-05 RX ORDER — OMEPRAZOLE 20 MG/1
CAPSULE, DELAYED RELEASE ORAL
Qty: 90 CAPSULE | Refills: 0 | Status: SHIPPED | OUTPATIENT
Start: 2022-04-05 | End: 2022-07-06

## 2022-04-05 NOTE — TELEPHONE ENCOUNTER
Rx Refill Note  Requested Prescriptions     Pending Prescriptions Disp Refills   • omeprazole (priLOSEC) 20 MG capsule [Pharmacy Med Name: OMEPRAZOLE DR 20 MG CAPSULE] 90 capsule 0     Sig: TAKE ONE CAPSULE BY MOUTH DAILY      Last office visit with prescribing clinician: 3/14/2022      Next office visit with prescribing clinician: 4/11/2022            Royce Crawford MA  04/05/22, 12:03 EDT

## 2022-06-08 DIAGNOSIS — E03.4 HYPOTHYROIDISM DUE TO ACQUIRED ATROPHY OF THYROID: ICD-10-CM

## 2022-06-08 RX ORDER — LEVOTHYROXINE SODIUM 0.15 MG/1
TABLET ORAL
Qty: 90 TABLET | Refills: 0 | Status: SHIPPED | OUTPATIENT
Start: 2022-06-08 | End: 2022-09-09

## 2022-06-08 NOTE — TELEPHONE ENCOUNTER
Rx Refill Note  Requested Prescriptions     Pending Prescriptions Disp Refills   • levothyroxine (SYNTHROID, LEVOTHROID) 150 MCG tablet [Pharmacy Med Name: LEVOTHYROXINE 150 MCG TABLET] 90 tablet 0     Sig: TAKE ONE TABLET BY MOUTH DAILY      Last office visit with prescribing clinician: 3/14/2022      Next office visit with prescribing clinician: 6/10/2022            Xiomara Price MA  06/08/22, 12:14 EDT

## 2022-06-10 ENCOUNTER — OFFICE VISIT (OUTPATIENT)
Dept: FAMILY MEDICINE CLINIC | Facility: CLINIC | Age: 87
End: 2022-06-10

## 2022-06-10 VITALS
SYSTOLIC BLOOD PRESSURE: 136 MMHG | WEIGHT: 219.6 LBS | RESPIRATION RATE: 16 BRPM | HEIGHT: 68 IN | DIASTOLIC BLOOD PRESSURE: 82 MMHG | BODY MASS INDEX: 33.28 KG/M2 | HEART RATE: 69 BPM | OXYGEN SATURATION: 97 %

## 2022-06-10 DIAGNOSIS — I10 PRIMARY HYPERTENSION: ICD-10-CM

## 2022-06-10 DIAGNOSIS — E11.9 TYPE 2 DIABETES MELLITUS WITHOUT COMPLICATION, WITHOUT LONG-TERM CURRENT USE OF INSULIN: Primary | ICD-10-CM

## 2022-06-10 LAB
EXPIRATION DATE: NORMAL
HBA1C MFR BLD: 7.3 %
Lab: NORMAL

## 2022-06-10 PROCEDURE — 83036 HEMOGLOBIN GLYCOSYLATED A1C: CPT | Performed by: FAMILY MEDICINE

## 2022-06-10 PROCEDURE — 3051F HG A1C>EQUAL 7.0%<8.0%: CPT | Performed by: FAMILY MEDICINE

## 2022-06-10 PROCEDURE — 99214 OFFICE O/P EST MOD 30 MIN: CPT | Performed by: FAMILY MEDICINE

## 2022-06-10 RX ORDER — METFORMIN HYDROCHLORIDE 500 MG/1
500 TABLET, EXTENDED RELEASE ORAL
Qty: 90 TABLET | Refills: 3 | Status: SHIPPED | OUTPATIENT
Start: 2022-06-10
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
## 2022-06-10 NOTE — ASSESSMENT & PLAN NOTE
Diabetes is worsening.   Dietary recommendations for ADA diet.  Regular aerobic exercise.  Reminded to get yearly retinal exam.  Medication changes per orders.  Diabetes will be reassessed in 3 months.    I have prescribed him metformin for him to take with breakfast.

## 2022-07-06 RX ORDER — OMEPRAZOLE 20 MG/1
CAPSULE, DELAYED RELEASE ORAL
Qty: 90 CAPSULE | Refills: 0 | Status: SHIPPED | OUTPATIENT
Start: 2022-07-06 | End: 2022-09-28

## 2022-07-06 NOTE — TELEPHONE ENCOUNTER
Rx Refill Note  Requested Prescriptions     Pending Prescriptions Disp Refills   • omeprazole (priLOSEC) 20 MG capsule [Pharmacy Med Name: OMEPRAZOLE DR 20 MG CAPSULE] 90 capsule 0     Sig: TAKE ONE CAPSULE BY MOUTH DAILY      Last office visit with prescribing clinician: 6/10/2022      Next office visit with prescribing clinician: 9/12/2022            Soco Zelaya MA  07/06/22, 12:21 EDT

## 2022-09-08 DIAGNOSIS — E03.4 HYPOTHYROIDISM DUE TO ACQUIRED ATROPHY OF THYROID: ICD-10-CM

## 2022-09-09 RX ORDER — LEVOTHYROXINE SODIUM 0.15 MG/1
TABLET ORAL
Qty: 90 TABLET | Refills: 0 | Status: SHIPPED | OUTPATIENT
Start: 2022-09-09 | End: 2022-12-09

## 2022-09-09 NOTE — TELEPHONE ENCOUNTER
Rx Refill Note  Requested Prescriptions     Pending Prescriptions Disp Refills   • levothyroxine (SYNTHROID, LEVOTHROID) 150 MCG tablet [Pharmacy Med Name: LEVOTHYROXINE 150 MCG TABLET] 90 tablet 0     Sig: TAKE ONE TABLET BY MOUTH DAILY      Last office visit with prescribing clinician: 6/10/2022      Next office visit with prescribing clinician: 9/12/2022            Eleno Hamilton MA  09/09/22, 10:43 EDT

## 2022-09-12 ENCOUNTER — LAB (OUTPATIENT)
Dept: LAB | Facility: HOSPITAL | Age: 87
End: 2022-09-12

## 2022-09-12 ENCOUNTER — OFFICE VISIT (OUTPATIENT)
Dept: FAMILY MEDICINE CLINIC | Facility: CLINIC | Age: 87
End: 2022-09-12

## 2022-09-12 VITALS
DIASTOLIC BLOOD PRESSURE: 70 MMHG | TEMPERATURE: 96.9 F | HEIGHT: 68 IN | WEIGHT: 223 LBS | BODY MASS INDEX: 33.8 KG/M2 | SYSTOLIC BLOOD PRESSURE: 110 MMHG | HEART RATE: 52 BPM

## 2022-09-12 DIAGNOSIS — I10 PRIMARY HYPERTENSION: ICD-10-CM

## 2022-09-12 DIAGNOSIS — E11.9 TYPE 2 DIABETES MELLITUS WITHOUT COMPLICATION, WITHOUT LONG-TERM CURRENT USE OF INSULIN: ICD-10-CM

## 2022-09-12 DIAGNOSIS — E03.4 HYPOTHYROIDISM DUE TO ACQUIRED ATROPHY OF THYROID: ICD-10-CM

## 2022-09-12 DIAGNOSIS — R53.83 LOW ENERGY: ICD-10-CM

## 2022-09-12 DIAGNOSIS — L57.0 ACTINIC KERATOSES: ICD-10-CM

## 2022-09-12 DIAGNOSIS — E11.9 TYPE 2 DIABETES MELLITUS WITHOUT COMPLICATION, WITHOUT LONG-TERM CURRENT USE OF INSULIN: Primary | ICD-10-CM

## 2022-09-12 LAB
EXPIRATION DATE: NORMAL
HBA1C MFR BLD: 7 %
Lab: NORMAL

## 2022-09-12 PROCEDURE — 3051F HG A1C>EQUAL 7.0%<8.0%: CPT | Performed by: FAMILY MEDICINE

## 2022-09-12 PROCEDURE — 83036 HEMOGLOBIN GLYCOSYLATED A1C: CPT | Performed by: FAMILY MEDICINE

## 2022-09-12 PROCEDURE — 99214 OFFICE O/P EST MOD 30 MIN: CPT | Performed by: FAMILY MEDICINE

## 2022-09-12 NOTE — ASSESSMENT & PLAN NOTE
- The patient's A1c today is 7%. He was advised to decrease sugar intake which may aid his energy.
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
## 2022-09-13 LAB
ALBUMIN SERPL-MCNC: 4.3 G/DL (ref 3.5–5.2)
ALBUMIN/GLOB SERPL: 2.5 G/DL
ALP SERPL-CCNC: 54 U/L (ref 39–117)
ALT SERPL-CCNC: 14 U/L (ref 1–41)
AST SERPL-CCNC: 17 U/L (ref 1–40)
BASOPHILS # BLD AUTO: 0.03 10*3/MM3 (ref 0–0.2)
BASOPHILS NFR BLD AUTO: 0.6 % (ref 0–1.5)
BILIRUB SERPL-MCNC: 0.3 MG/DL (ref 0–1.2)
BUN SERPL-MCNC: 16 MG/DL (ref 8–23)
BUN/CREAT SERPL: 17.4 (ref 7–25)
CALCIUM SERPL-MCNC: 9.2 MG/DL (ref 8.6–10.5)
CHLORIDE SERPL-SCNC: 106 MMOL/L (ref 98–107)
CO2 SERPL-SCNC: 25 MMOL/L (ref 22–29)
CREAT SERPL-MCNC: 0.92 MG/DL (ref 0.76–1.27)
EGFRCR-CYS SERPLBLD CKD-EPI 2021: 81 ML/MIN/1.73
EOSINOPHIL # BLD AUTO: 0.11 10*3/MM3 (ref 0–0.4)
EOSINOPHIL NFR BLD AUTO: 2.1 % (ref 0.3–6.2)
ERYTHROCYTE [DISTWIDTH] IN BLOOD BY AUTOMATED COUNT: 13.2 % (ref 12.3–15.4)
GLOBULIN SER CALC-MCNC: 1.7 GM/DL
GLUCOSE SERPL-MCNC: 166 MG/DL (ref 65–99)
HCT VFR BLD AUTO: 39.6 % (ref 37.5–51)
HGB BLD-MCNC: 13.4 G/DL (ref 13–17.7)
IMM GRANULOCYTES # BLD AUTO: 0.02 10*3/MM3 (ref 0–0.05)
IMM GRANULOCYTES NFR BLD AUTO: 0.4 % (ref 0–0.5)
LYMPHOCYTES # BLD AUTO: 1.09 10*3/MM3 (ref 0.7–3.1)
LYMPHOCYTES NFR BLD AUTO: 21.3 % (ref 19.6–45.3)
MCH RBC QN AUTO: 29.7 PG (ref 26.6–33)
MCHC RBC AUTO-ENTMCNC: 33.8 G/DL (ref 31.5–35.7)
MCV RBC AUTO: 87.8 FL (ref 79–97)
MONOCYTES # BLD AUTO: 0.44 10*3/MM3 (ref 0.1–0.9)
MONOCYTES NFR BLD AUTO: 8.6 % (ref 5–12)
NEUTROPHILS # BLD AUTO: 3.43 10*3/MM3 (ref 1.7–7)
NEUTROPHILS NFR BLD AUTO: 67 % (ref 42.7–76)
NRBC BLD AUTO-RTO: 0 /100 WBC (ref 0–0.2)
PLATELET # BLD AUTO: 152 10*3/MM3 (ref 140–450)
POTASSIUM SERPL-SCNC: 4.6 MMOL/L (ref 3.5–5.2)
PROT SERPL-MCNC: 6 G/DL (ref 6–8.5)
RBC # BLD AUTO: 4.51 10*6/MM3 (ref 4.14–5.8)
SODIUM SERPL-SCNC: 141 MMOL/L (ref 136–145)
TSH SERPL DL<=0.005 MIU/L-ACNC: 0.99 UIU/ML (ref 0.27–4.2)
WBC # BLD AUTO: 5.12 10*3/MM3 (ref 3.4–10.8)

## 2022-09-28 RX ORDER — OMEPRAZOLE 20 MG/1
CAPSULE, DELAYED RELEASE ORAL
Qty: 90 CAPSULE | Refills: 0 | Status: SHIPPED | OUTPATIENT
Start: 2022-09-28 | End: 2023-01-09

## 2022-09-28 RX ORDER — PRAVASTATIN SODIUM 20 MG
TABLET ORAL
Qty: 90 TABLET | Refills: 3 | Status: SHIPPED | OUTPATIENT
Start: 2022-09-28

## 2022-09-28 NOTE — TELEPHONE ENCOUNTER
Rx Refill Note  Requested Prescriptions     Pending Prescriptions Disp Refills   • pravastatin (PRAVACHOL) 20 MG tablet [Pharmacy Med Name: PRAVASTATIN SODIUM 20 MG TAB] 90 tablet 3     Sig: TAKE ONE TABLET BY MOUTH DAILY   • omeprazole (priLOSEC) 20 MG capsule [Pharmacy Med Name: OMEPRAZOLE DR 20 MG CAPSULE] 90 capsule 0     Sig: TAKE ONE CAPSULE BY MOUTH DAILY      Last office visit with prescribing clinician: 9/12/2022      Next office visit with prescribing clinician: 12/17/2022            Royce Crawford MA  09/28/22, 13:28 EDT

## 2022-11-11 ENCOUNTER — HOSPITAL ENCOUNTER (OUTPATIENT)
Dept: GENERAL RADIOLOGY | Facility: HOSPITAL | Age: 87
Discharge: HOME | End: 2022-11-11
Admitting: FAMILY MEDICINE

## 2022-11-11 ENCOUNTER — OFFICE VISIT (OUTPATIENT)
Dept: FAMILY MEDICINE CLINIC | Facility: CLINIC | Age: 87
End: 2022-11-11

## 2022-11-11 VITALS
SYSTOLIC BLOOD PRESSURE: 128 MMHG | HEART RATE: 94 BPM | OXYGEN SATURATION: 98 % | DIASTOLIC BLOOD PRESSURE: 82 MMHG | TEMPERATURE: 97.1 F

## 2022-11-11 DIAGNOSIS — M25.552 ACUTE HIP PAIN, LEFT: Primary | ICD-10-CM

## 2022-11-11 DIAGNOSIS — M25.552 ACUTE HIP PAIN, LEFT: ICD-10-CM

## 2022-11-11 PROCEDURE — 73502 X-RAY EXAM HIP UNI 2-3 VIEWS: CPT

## 2022-11-11 PROCEDURE — 99213 OFFICE O/P EST LOW 20 MIN: CPT | Performed by: FAMILY MEDICINE

## 2022-11-11 RX ORDER — TRAMADOL HYDROCHLORIDE 50 MG/1
50 TABLET ORAL EVERY 6 HOURS PRN
Qty: 15 TABLET | Refills: 0 | Status: SHIPPED | OUTPATIENT
Start: 2022-11-11 | End: 2023-02-02

## 2022-11-11 RX ORDER — INFLUENZA A VIRUS A/MICHIGAN/45/2015 X-275 (H1N1) ANTIGEN (FORMALDEHYDE INACTIVATED), INFLUENZA A VIRUS A/SINGAPORE/INFIMH-16-0019/2016 IVR-186 (H3N2) ANTIGEN (FORMALDEHYDE INACTIVATED), INFLUENZA B VIRUS B/PHUKET/3073/2013 ANTIGEN (FORMALDEHYDE INACTIVATED), AND INFLUENZA B VIRUS B/MARYLAND/15/2016 BX-69A ANTIGEN (FORMALDEHYDE INACTIVATED) 60; 60; 60; 60 UG/.7ML; UG/.7ML; UG/.7ML; UG/.7ML
INJECTION, SUSPENSION INTRAMUSCULAR
COMMUNITY
Start: 2022-09-24 | End: 2023-01-19
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
## 2022-11-17 ENCOUNTER — OFFICE VISIT (OUTPATIENT)
Dept: ORTHOPEDIC SURGERY | Facility: CLINIC | Age: 87
End: 2022-11-17

## 2022-11-17 VITALS
WEIGHT: 222.66 LBS | HEIGHT: 68 IN | BODY MASS INDEX: 33.75 KG/M2 | SYSTOLIC BLOOD PRESSURE: 124 MMHG | DIASTOLIC BLOOD PRESSURE: 80 MMHG

## 2022-11-17 DIAGNOSIS — M16.12 PRIMARY OSTEOARTHRITIS OF LEFT HIP: ICD-10-CM

## 2022-11-17 DIAGNOSIS — M70.62 GREATER TROCHANTERIC BURSITIS OF LEFT HIP: Primary | ICD-10-CM

## 2022-11-17 PROCEDURE — 99204 OFFICE O/P NEW MOD 45 MIN: CPT | Performed by: STUDENT IN AN ORGANIZED HEALTH CARE EDUCATION/TRAINING PROGRAM

## 2022-11-17 PROCEDURE — 20610 DRAIN/INJ JOINT/BURSA W/O US: CPT | Performed by: STUDENT IN AN ORGANIZED HEALTH CARE EDUCATION/TRAINING PROGRAM

## 2022-11-17 RX ORDER — ROPIVACAINE HYDROCHLORIDE 5 MG/ML
4 INJECTION, SOLUTION EPIDURAL; INFILTRATION; PERINEURAL
Status: COMPLETED | OUTPATIENT
Start: 2022-11-17 | End: 2022-11-17

## 2022-11-17 RX ORDER — MELOXICAM 15 MG/1
15 TABLET ORAL DAILY
Qty: 30 TABLET | Refills: 1 | Status: SHIPPED | OUTPATIENT
Start: 2022-11-17 | End: 2023-01-12

## 2022-11-17 RX ORDER — LIDOCAINE HYDROCHLORIDE 10 MG/ML
4 INJECTION, SOLUTION EPIDURAL; INFILTRATION; INTRACAUDAL; PERINEURAL
Status: COMPLETED | OUTPATIENT
Start: 2022-11-17 | End: 2022-11-17

## 2022-11-17 RX ORDER — TRIAMCINOLONE ACETONIDE 40 MG/ML
80 INJECTION, SUSPENSION INTRA-ARTICULAR; INTRAMUSCULAR
Status: COMPLETED | OUTPATIENT
Start: 2022-11-17 | End: 2022-11-17

## 2022-11-17 RX ADMIN — TRIAMCINOLONE ACETONIDE 80 MG: 40 INJECTION, SUSPENSION INTRA-ARTICULAR; INTRAMUSCULAR at 11:48

## 2022-11-17 RX ADMIN — ROPIVACAINE HYDROCHLORIDE 4 ML: 5 INJECTION, SOLUTION EPIDURAL; INFILTRATION; PERINEURAL at 11:48

## 2022-11-17 RX ADMIN — LIDOCAINE HYDROCHLORIDE 4 ML: 10 INJECTION, SOLUTION EPIDURAL; INFILTRATION; INTRACAUDAL; PERINEURAL at 11:48

## 2022-11-17 NOTE — PROGRESS NOTES
Procedure   Large Joint Arthrocentesis: L greater trochanteric bursa  Date/Time: 11/17/2022 11:48 AM  Consent given by: patient  Site marked: site marked  Timeout: Immediately prior to procedure a time out was called to verify the correct patient, procedure, equipment, support staff and site/side marked as required   Supporting Documentation  Indications: pain   Procedure Details  Location: hip - L greater trochanteric bursa  Preparation: Patient was prepped and draped in the usual sterile fashion  Needle size: 22 G  Approach: lateral  Medications administered: 4 mL ropivacaine 0.5 %; 4 mL lidocaine PF 1% 1 %; 80 mg triamcinolone acetonide 40 MG/ML  Patient tolerance: patient tolerated the procedure well with no immediate complications
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
## 2022-12-09 DIAGNOSIS — E03.4 HYPOTHYROIDISM DUE TO ACQUIRED ATROPHY OF THYROID: ICD-10-CM

## 2022-12-09 RX ORDER — LEVOTHYROXINE SODIUM 0.15 MG/1
TABLET ORAL
Qty: 90 TABLET | Refills: 0 | Status: SHIPPED | OUTPATIENT
Start: 2022-12-09 | End: 2023-03-10

## 2022-12-13 ENCOUNTER — LAB (OUTPATIENT)
Dept: LAB | Facility: HOSPITAL | Age: 87
End: 2022-12-13

## 2022-12-13 ENCOUNTER — TRANSCRIBE ORDERS (OUTPATIENT)
Dept: LAB | Facility: HOSPITAL | Age: 87
End: 2022-12-13

## 2022-12-13 DIAGNOSIS — C61 MALIGNANT NEOPLASM OF PROSTATE: ICD-10-CM

## 2022-12-13 DIAGNOSIS — C61 MALIGNANT NEOPLASM OF PROSTATE: Primary | ICD-10-CM

## 2022-12-13 LAB — PSA SERPL-MCNC: 0.09 NG/ML (ref 0–4)

## 2022-12-13 PROCEDURE — 36415 COLL VENOUS BLD VENIPUNCTURE: CPT

## 2022-12-13 PROCEDURE — 84153 ASSAY OF PSA TOTAL: CPT

## 2022-12-14 ENCOUNTER — OFFICE VISIT (OUTPATIENT)
Dept: ORTHOPEDIC SURGERY | Facility: CLINIC | Age: 87
End: 2022-12-14

## 2022-12-14 VITALS
BODY MASS INDEX: 33.65 KG/M2 | SYSTOLIC BLOOD PRESSURE: 182 MMHG | WEIGHT: 222 LBS | DIASTOLIC BLOOD PRESSURE: 80 MMHG | HEIGHT: 68 IN

## 2022-12-14 DIAGNOSIS — M70.62 GREATER TROCHANTERIC BURSITIS OF LEFT HIP: Primary | ICD-10-CM

## 2022-12-14 DIAGNOSIS — M16.12 PRIMARY OSTEOARTHRITIS OF LEFT HIP: ICD-10-CM

## 2022-12-14 PROCEDURE — 99213 OFFICE O/P EST LOW 20 MIN: CPT | Performed by: STUDENT IN AN ORGANIZED HEALTH CARE EDUCATION/TRAINING PROGRAM

## 2022-12-14 NOTE — PROGRESS NOTES
[facility] [place] [facility] Follow Up Visit     Office Follow Up      Date: 12/14/2022   Patient Name: Vic Rahman  MRN: [id number]  YOB: 1935    Referring Physician: No ref. provider found     Chief Complaint:   Chief Complaint   Patient presents with   • Left Hip - Pain     1 month recheck- Greater trochanteric bursitis of left hip; Primary osteoarthritis of left hip       History of Present Illness: Vic Rahman is a 87 y.o. male who is here today for follow up on left hip pain from greater trochanteric bursitis.  His radiographs also revealed primary hip osteoarthritis.  At last visit, he received a corticosteroid injection into the left lateral hip.  He was also started on meloxicam.  He reports interval improvement in his symptoms though they have not completely resolved.  His symptoms are still made worse by walking and rising from a seated position.  He continues to use crutches but is walking much easier and faster.  He denies any new symptoms or mechanism of injury.    Subjective   Review of Systems: Review of Systems   Constitutional: Negative for chills, fever, unexpected weight gain and unexpected weight loss.   HENT: Negative for congestion, postnasal drip and rhinorrhea.    Eyes: Negative for blurred vision.   Respiratory: Negative for shortness of breath.    Cardiovascular: Negative for leg swelling.   Gastrointestinal: Negative for abdominal pain, nausea and vomiting.   Genitourinary: Negative for difficulty urinating.   Musculoskeletal: Positive for arthralgias. Negative for gait problem, joint swelling and myalgias.   Skin: Negative for skin lesions and wound.   Neurological: Negative for dizziness, weakness, light-headedness and numbness.   Hematological: Does not bruise/bleed easily.   Psychiatric/Behavioral: Negative for depressed mood.        Medications:   Current Outpatient Medications:   •  Fluzone High-Dose Quadrivalent 0.7 ML  "suspension prefilled syringe injection, , Disp: , Rfl:   •  levothyroxine (SYNTHROID, LEVOTHROID) 150 MCG tablet, TAKE ONE TABLET BY MOUTH DAILY, Disp: 90 tablet, Rfl: 0  •  losartan (COZAAR) 50 MG tablet, Take 1 tablet by mouth Every Morning., Disp: 90 tablet, Rfl: 3  •  Melatonin 3 MG capsule, Take 1 capsule by mouth At Night As Needed., Disp: , Rfl:   •  meloxicam (MOBIC) 15 MG tablet, Take 1 tablet by mouth Daily., Disp: 30 tablet, Rfl: 1  •  metFORMIN ER (GLUCOPHAGE-XR) 500 MG 24 hr tablet, Take 1 tablet by mouth Daily With Breakfast., Disp: 90 tablet, Rfl: 3  •  Multiple Vitamins-Minerals (ICAPS PO), Take 1 capsule by mouth daily., Disp: , Rfl:   •  omeprazole (priLOSEC) 20 MG capsule, TAKE ONE CAPSULE BY MOUTH DAILY, Disp: 90 capsule, Rfl: 0  •  polyethylene glycol (MIRALAX) powder, Take 17 g by mouth Daily As Needed., Disp: , Rfl:   •  pravastatin (PRAVACHOL) 20 MG tablet, TAKE ONE TABLET BY MOUTH DAILY, Disp: 90 tablet, Rfl: 3  •  traMADol (ULTRAM) 50 MG tablet, Take 1 tablet by mouth Every 6 (Six) Hours As Needed for Severe Pain., Disp: 15 tablet, Rfl: 0    Allergies:   Allergies   Allergen Reactions   • Trazodone      hangover       I have reviewed and updated the patient's chief complaint, history of present illness, review of systems, past medical history, surgical history, family history, social history, medications and allergy list as appropriate.     Objective    Vital Signs:   Vitals:    12/14/22 1404   BP: (!) 182/80   Weight: 101 kg (222 lb)   Height: 172.7 cm (67.99\")     Body mass index is 33.76 kg/m².  BMI is >= 30 and <35. (Class 1 Obesity). The following options were offered after discussion;: exercise counseling/recommendations and nutrition counseling/recommendations    Patient is a current everyday smoker of cigarettes and pipe.  He has a pack year history of 42.  In this visit the patient was advised to stop smoking and was offered tobacco cessation measures and resources, including NRT " and/or medication intervention. At least 5 minutes was spent on face-to-face counseling regarding smoking cessation.    Ortho Exam:  Constitutional: Well developed. Well nourished.  Psychologic: Mood is appropriate. Appropriate affect.  Head and Face: Normocephalic. No obvious deformities. External Ears Normal, no lesions or masses, grossly normal hearing.  Eyes: Extraocular movements intact  Pulmonary: Unlabored, normal effort.  Cardiac: well perfused, extremities pink.  Abdomen: non-distended  Peripheral vascular: normal, pulses intact, sensation intact  Skin: No rashes on exposed skin surface.  Neuro: AOx3, No short or long term memory impairment.  Left hip: No erythema ecchymosis or swelling.  Tender to palpation at the lateral aspect of the hip.  Limited range of motion in flexion extension secondary to pain.  Positive Tayla with pain laterally, FADIR with pain to the anterior hip/groin.  4+/5 strength secondary to pain.  Sensation intact to light touch.    Results Review:   Imaging Results (Last 24 Hours)     ** No results found for the last 24 hours. **          Procedures    Assessment / Plan    Assessment/Plan:   Diagnoses and all orders for this visit:    1. Greater trochanteric bursitis of left hip (Primary)  -     Ambulatory Referral to Physical Therapy Evaluate and treat, Ortho; Electrotherapy; Iontophoresis, E-stim, Tens (Home); Soft Tissue Mobilizaton; Stretching, ROM, Strengthening; Left; Full weight bearing    2. Primary osteoarthritis of left hip      Interval improvement in symptoms with corticosteroid injection and meloxicam.  However, symptoms persist and he is still using crutches to ambulate.  He is also developed more anterior hip/groin pain that I believe is compensatory.  He is much less tender on exam today.  My goal is to get him back walking without crutches.  In order to do this, I believe he be best served by starting in person physical therapy.  He was provided with a referral today.   I will have him continue taking meloxicam.  I plan to see him back in 5 weeks to monitor his response.  Should he not show signs of improvement by the next visit, I would consider repeating injection with Toradol under ultrasound guidance or obtaining MRI of the left hip.    Follow Up:   Return in about 5 weeks (around 1/18/2023) for Recheck.      Albaro Guzman MD  [facility]

## 2022-12-27 ENCOUNTER — TREATMENT (OUTPATIENT)
Dept: PHYSICAL THERAPY | Facility: CLINIC | Age: 87
End: 2022-12-27

## 2022-12-27 ENCOUNTER — TELEPHONE (OUTPATIENT)
Dept: FAMILY MEDICINE CLINIC | Facility: CLINIC | Age: 87
End: 2022-12-27

## 2022-12-27 DIAGNOSIS — M25.552 LEFT HIP PAIN: Primary | ICD-10-CM

## 2022-12-27 PROCEDURE — 97140 MANUAL THERAPY 1/> REGIONS: CPT | Performed by: PHYSICAL THERAPIST

## 2022-12-27 PROCEDURE — 97110 THERAPEUTIC EXERCISES: CPT | Performed by: PHYSICAL THERAPIST

## 2022-12-27 PROCEDURE — 97163 PT EVAL HIGH COMPLEX 45 MIN: CPT | Performed by: PHYSICAL THERAPIST

## 2022-12-27 NOTE — TELEPHONE ENCOUNTER
Caller: Vic Rahman    Relationship: Self    Best call back number: [phone]    Requested Prescriptions:   MEDICATION FOR COUGH AND CONGESTION    Pharmacy where request should be sent: [facility] [id number] [address] AT [facility] [phone] [facility] [phone]      Additional details provided by patient: PATIENT HAS CONGESTION AND COUGH AND WANTED TO TRY [facility], DECLINED APPT    Does the patient have less than a 3 day supply:  [x] Yes  [] No    Would you like a call back once the refill request has been completed: [] Yes [x] No    If the office needs to give you a call back, can they leave a voicemail: [] Yes [x] No    Mark Anthony Magaña, LAURIE   12/27/22 11:27 EST

## 2022-12-27 NOTE — PROGRESS NOTES
Physical Therapy Initial Evaluation and Plan of Care    [facility]  [address]  [place], [place] [address] [phone]    Patient: Vic Rahman   : 1935  Diagnosis/ICD-10 Code:  No primary diagnosis found.  Referring practitioner: Jona Guzman MD    Subjective Evaluation    History of Present Illness  Date of onset: 10/27/2022  Mechanism of injury: Insidious onset likely related overloading over a short period of time.    Subjective comment: Started having left hip pain after prolonged yardwork as well as [place] a lot [place] airports [place] traveling.  Pain is located at the left lateral hip.  Denies numbness and tingling in the left leg.  First time having left hip pain.  Has been using crutches for the past 6 weeks or so.  Had intra-articular hip injection and started Meloxicam without much relief.  Not tried ice or heat.  No nocturnal disturbance.  Patient Occupation: Retired  Quality of life: excellent    Pain  Relieving factors: rest  Exacerbated by: Walking/weight-bearing; flexion hip.  Progression: no change    Social Support  Lives in: multiple-level home  Lives with: alone    Treatments  Previous treatment: injection treatment and medication  Patient Goals  Patient goals for therapy: decreased pain  Patient goal: Get rid of crutches.         *LEFS:  22/80    Objective          Active Range of Motion   Left Hip   Flexion: 20 (lateral hip pain) degrees     Right Hip   Flexion: 40 degrees     Passive Range of Motion     Additional Passive Range of Motion Details  *Seated hip IR:  R->33 deg; L->25 deg (left lateral hip pain)     General Comments     Hip Comments   *(+) reproduction of comparable sign with pressure applied along left lateral hip at greater trochanter and distal gmd/min attachment         Assessment & Plan     Assessment  Impairments: abnormal gait, abnormal or restricted ROM, activity intolerance, lacks appropriate  home exercise program, pain with function and weight-bearing intolerance  Functional Limitations: walking and standing  Assessment details: Mr. Rahman is a very pleasant 87 year old male that presents to physical therapy with chronic left lateral hip pain starting 2 months ago after extensive yard work and walking.  PMH was covered and reviewed during interview.  Hip mobility is slightly limited into internal rotation on the left.  Has pain with active hip flexion and tender along the left lateral hip.  Will focus care on improving loading tolerance of the left lateral hip soft tissue structures combined with a graded loading program.  Counseled Mr. Rahman on using a rolling walker instead of crutches for safety and to improve ambulation quality.  Prognosis: good    Goals  Plan Goals: STGs:  1.)  LEFS improved x 1 MCID in 6 weeks.  2.)  Have 35 deg of active hip flexion without pain in 6 weeks.  LTGs:  1.)  Have no pain with passive hip internal rotation to 35 deg in 8 weeks.  2.)  Ambulate without an assistive device with excellent gait quality in 12 weeks.    Plan  Therapy options: will be seen for skilled therapy services  Planned modality interventions: thermotherapy (hydrocollator packs) and cryotherapy  Planned therapy interventions: therapeutic activities, stretching, strengthening, manual therapy, abdominal trunk stabilization, balance/weight-bearing training, functional ROM exercises and home exercise program  Frequency: 1x week  Duration in visits: 12  Duration in weeks: 12  Treatment plan discussed with: patient        Manual Therapy:    8     mins  35832;  Therapeutic Exercise:    15     mins  56196;     Neuromuscular Antonina:        mins  95034;    Therapeutic Activity:          mins  88580;     Gait Training:           mins  91572;     Ultrasound:          mins  93411;    Electrical Stimulation:         mins  23966 ( );  Dry Needling          mins self-pay    Timed Treatment:   23   mins   Total  Treatment:     49   mins    PT SIGNATURE: Héctor Javed, PT   DATE TREATMENT INITIATED: 12/27/2022    Initial Certification  Certification Period: 3/27/2023  I certify that the therapy services are furnished while this patient is under my care.  The services outlined above are required by this patient, and will be reviewed every 90 days.     PHYSICIAN: Jona Guzman MD  NPI: [id number]                                      DATE:    Please sign and return via fax to [phone].. Thank you, [facility] [place] [facility].

## 2022-12-28 ENCOUNTER — OFFICE VISIT (OUTPATIENT)
Dept: FAMILY MEDICINE CLINIC | Facility: CLINIC | Age: 87
End: 2022-12-28

## 2022-12-28 VITALS
DIASTOLIC BLOOD PRESSURE: 70 MMHG | HEART RATE: 91 BPM | OXYGEN SATURATION: 97 % | HEIGHT: 68 IN | SYSTOLIC BLOOD PRESSURE: 140 MMHG | BODY MASS INDEX: 33.04 KG/M2 | WEIGHT: 218 LBS

## 2022-12-28 DIAGNOSIS — R09.81 HEAD CONGESTION: ICD-10-CM

## 2022-12-28 DIAGNOSIS — R06.2 WHEEZE: ICD-10-CM

## 2022-12-28 DIAGNOSIS — J06.9 UPPER RESPIRATORY TRACT INFECTION, UNSPECIFIED TYPE: Primary | ICD-10-CM

## 2022-12-28 LAB
EXPIRATION DATE: NORMAL
FLUAV AG UPPER RESP QL IA.RAPID: NOT DETECTED
FLUBV AG UPPER RESP QL IA.RAPID: NOT DETECTED
INTERNAL CONTROL: NORMAL
Lab: NORMAL
SARS-COV-2 AG UPPER RESP QL IA.RAPID: NOT DETECTED

## 2022-12-28 PROCEDURE — 99213 OFFICE O/P EST LOW 20 MIN: CPT | Performed by: NURSE PRACTITIONER

## 2022-12-28 PROCEDURE — 87428 SARSCOV & INF VIR A&B AG IA: CPT | Performed by: NURSE PRACTITIONER

## 2022-12-28 RX ORDER — METHYLPREDNISOLONE 4 MG/1
TABLET ORAL
Qty: 1 EACH | Refills: 0 | Status: SHIPPED | OUTPATIENT
Start: 2022-12-28 | End: 2023-01-19

## 2022-12-28 RX ORDER — AMOXICILLIN 875 MG/1
875 TABLET, COATED ORAL 2 TIMES DAILY
Qty: 14 TABLET | Refills: 0 | Status: SHIPPED | OUTPATIENT
Start: 2022-12-28 | End: 2023-01-04

## 2022-12-28 RX ORDER — BENZONATATE 100 MG/1
100 CAPSULE ORAL 3 TIMES DAILY PRN
Qty: 30 CAPSULE | Refills: 0 | Status: SHIPPED | OUTPATIENT
Start: 2022-12-28 | End: 2023-02-02

## 2022-12-28 NOTE — PROGRESS NOTES
"[facility] Complaint  Cough (Symptoms 2 weeks), URI, and Sore Throat    Subjective        Vic Rahman presents to [facility]  History of Present Illness  Pt is here today with symptoms that have been going on for about 2 weeks. He reports strong cough, sore throat, nasal drainage. He reports thick green nasal drainage. Cough is non-productive. He has been taking over the counter cold medicine and cough syrup. He is a 1 ppd smoker.   Cough  This is a new problem. The current episode started 1 to 4 weeks ago. The cough is non-productive. Associated symptoms include a sore throat. Nothing aggravates the symptoms. The treatment provided mild relief.   URI   Associated symptoms include coughing and a sore throat.   Sore Throat   Associated symptoms include coughing.     Objective   Vital Signs:  /70   Pulse 91   Ht 172.7 cm (67.99\")   Wt 98.9 kg (218 lb)   SpO2 97%   BMI 33.15 kg/m²   Estimated body mass index is 33.15 kg/m² as calculated from the following:    Height as of this encounter: 172.7 cm (67.99\").    Weight as of this encounter: 98.9 kg (218 lb).          Physical Exam  Vitals reviewed.   Constitutional:       Appearance: Normal appearance.   HENT:      Right Ear: Tympanic membrane, ear canal and external ear normal.      Left Ear: Tympanic membrane, ear canal and external ear normal.      Nose: Congestion and rhinorrhea present.      Mouth/Throat:      Pharynx: No posterior oropharyngeal erythema.   Eyes:      Conjunctiva/sclera: Conjunctivae normal.   Cardiovascular:      Rate and Rhythm: Normal rate and regular rhythm.      Heart sounds: Normal heart sounds.   Pulmonary:      Effort: Pulmonary effort is normal.      Breath sounds: Wheezing present.   Musculoskeletal:         General: Normal range of motion.      Cervical back: Normal range of motion.   Skin:     General: Skin is warm.   Neurological:      Mental Status: He is alert and oriented to person, place, and " time.   Psychiatric:         Mood and Affect: Mood normal.         Behavior: Behavior normal.         Thought Content: Thought content normal.        Result Review :                Assessment and Plan   Diagnoses and all orders for this visit:    1. Head congestion (Primary)  -     POCT SARS-CoV-2 Antigen DENA + Flu    2. Upper respiratory tract infection, unspecified type  -     amoxicillin (AMOXIL) 875 MG tablet; Take 1 tablet by mouth 2 (Two) Times a Day for 7 days.  Dispense: 14 tablet; Refill: 0  -     benzonatate (Tessalon Perles) 100 MG capsule; Take 1 capsule by mouth 3 (Three) Times a Day As Needed for Cough.  Dispense: 30 capsule; Refill: 0    3. Wheeze  -     methylPREDNISolone (MEDROL) 4 MG dose pack; Take as directed on package instructions.  Dispense: 1 each; Refill: 0             Follow Up   No follow-ups on file.  Patient was given instructions and counseling regarding his condition or for health maintenance advice. Please see specific information pulled into the AVS if appropriate.

## 2022-12-29 ENCOUNTER — TREATMENT (OUTPATIENT)
Dept: PHYSICAL THERAPY | Facility: CLINIC | Age: 87
End: 2022-12-29

## 2022-12-29 DIAGNOSIS — M25.552 LEFT HIP PAIN: Primary | ICD-10-CM

## 2022-12-29 PROCEDURE — 97140 MANUAL THERAPY 1/> REGIONS: CPT | Performed by: PHYSICAL THERAPIST

## 2022-12-29 PROCEDURE — 97110 THERAPEUTIC EXERCISES: CPT | Performed by: PHYSICAL THERAPIST

## 2022-12-29 NOTE — PROGRESS NOTES
Physical Therapy Daily Progress Note    Patient: Vic Rahman   : 1935  Diagnosis/ICD-10 Code:  Left hip pain [M25.552]  Referring practitioner: Jona Guzman MD  Date of Initial Visit: Type: THERAPY  Noted: 2022  Today's Date: 2022  Patient seen for 2 sessions         Vic Rahman reports that his hip is the same since last visit.  States that he started taking antibiotics and oral steroids to improve coughing.  Tested negative for COVID.      Subjective     Objective   See Exercise, Manual, and Modality Logs for complete treatment.       Assessment/Plan  Focused visit on reducing left lateral hip sensitivity via manual therapy.  Combined with exercises to improve lateral hip strength and tension loading tolerance of the lateral hip soft tissue structures.             Manual Therapy:    15     mins  38428;  Therapeutic Exercise:    10     mins  71221;     Neuromuscular Antonina:        mins  62893;    Therapeutic Activity:          mins  79824;     Gait Training:           mins  87216;     Ultrasound:          mins  90161;    Electrical Stimulation:         mins  39735 ( );  Dry Needling          mins self-pay    Timed Treatment:   25   mins   Total Treatment:     25   mins    Héctor Javed PT  Physical Therapist

## 2023-01-09 RX ORDER — OMEPRAZOLE 20 MG/1
CAPSULE, DELAYED RELEASE ORAL
Qty: 90 CAPSULE | Refills: 0 | Status: SHIPPED | OUTPATIENT
Start: 2023-01-09 | End: 2023-04-03

## 2023-01-11 ENCOUNTER — TELEPHONE (OUTPATIENT)
Dept: PHYSICAL THERAPY | Facility: CLINIC | Age: 88
End: 2023-01-11

## 2023-01-12 DIAGNOSIS — M70.62 GREATER TROCHANTERIC BURSITIS OF LEFT HIP: ICD-10-CM

## 2023-01-12 RX ORDER — MELOXICAM 15 MG/1
TABLET ORAL
Qty: 30 TABLET | Refills: 1 | Status: SHIPPED | OUTPATIENT
Start: 2023-01-12

## 2023-01-16 ENCOUNTER — TREATMENT (OUTPATIENT)
Dept: PHYSICAL THERAPY | Facility: CLINIC | Age: 88
End: 2023-01-16
Payer: MEDICARE

## 2023-01-16 DIAGNOSIS — M25.552 LEFT HIP PAIN: Primary | ICD-10-CM

## 2023-01-16 PROCEDURE — 97110 THERAPEUTIC EXERCISES: CPT | Performed by: PHYSICAL THERAPIST

## 2023-01-16 PROCEDURE — 97140 MANUAL THERAPY 1/> REGIONS: CPT | Performed by: PHYSICAL THERAPIST
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
## 2023-01-19 ENCOUNTER — OFFICE VISIT (OUTPATIENT)
Dept: ORTHOPEDIC SURGERY | Facility: CLINIC | Age: 88
End: 2023-01-19
Payer: MEDICARE

## 2023-01-19 VITALS
SYSTOLIC BLOOD PRESSURE: 152 MMHG | WEIGHT: 218 LBS | BODY MASS INDEX: 33.04 KG/M2 | DIASTOLIC BLOOD PRESSURE: 74 MMHG | HEIGHT: 68 IN

## 2023-01-19 DIAGNOSIS — E11.65 TYPE 2 DIABETES MELLITUS WITH HYPERGLYCEMIA, WITHOUT LONG-TERM CURRENT USE OF INSULIN: Primary | ICD-10-CM

## 2023-01-19 DIAGNOSIS — M16.12 PRIMARY OSTEOARTHRITIS OF LEFT HIP: ICD-10-CM

## 2023-01-19 DIAGNOSIS — M70.62 GREATER TROCHANTERIC BURSITIS OF LEFT HIP: ICD-10-CM

## 2023-01-19 PROCEDURE — 99213 OFFICE O/P EST LOW 20 MIN: CPT | Performed by: STUDENT IN AN ORGANIZED HEALTH CARE EDUCATION/TRAINING PROGRAM

## 2023-01-19 NOTE — PROGRESS NOTES
[facility] Follow Up Visit     Office Follow Up      Date: 01/19/2023   Patient Name: Vic Rahman  MRN: [id number]  YOB: 1935    Referring Physician: No ref. provider found     Chief Complaint:   Chief Complaint   Patient presents with   • Follow-up     5 week recheck -- Greater trochanteric bursitis of left hip; primary osteoarthritis of left hip        History of Present Illness: Vic Rahman is a 87 y.o. male who is here today for follow up on left hip pain from greater trochanteric bursitis.  Since last visit, he has been in physical therapy and taking meloxicam as needed.  His symptoms have greatly improved.  Originally, he was having difficulty walking.  Now he is walking only with a cane primarily for balance.  His motion pain and strength are greatly improved.  He voices no acute complaints today.    Subjective   Review of Systems: Review of Systems   Constitutional: Negative for chills, fever, unexpected weight gain and unexpected weight loss.   HENT: Negative for congestion, postnasal drip and rhinorrhea.    Eyes: Negative for blurred vision.   Respiratory: Negative for shortness of breath.    Cardiovascular: Negative for leg swelling.   Gastrointestinal: Negative for abdominal pain, nausea and vomiting.   Genitourinary: Negative for difficulty urinating.   Musculoskeletal: Positive for arthralgias. Negative for gait problem, joint swelling and myalgias.   Skin: Negative for skin lesions and wound.   Neurological: Negative for dizziness, weakness, light-headedness and numbness.   Hematological: Does not bruise/bleed easily.   Psychiatric/Behavioral: Negative for depressed mood.        Medications:   Current Outpatient Medications:   •  benzonatate (Tessalon Perles) 100 MG capsule, Take 1 capsule by mouth 3 (Three) Times a Day As Needed for Cough., Disp: 30 capsule, Rfl: 0  •  levothyroxine (SYNTHROID, LEVOTHROID) 150 MCG  "tablet, TAKE ONE TABLET BY MOUTH DAILY, Disp: 90 tablet, Rfl: 0  •  losartan (COZAAR) 50 MG tablet, Take 1 tablet by mouth Every Morning., Disp: 90 tablet, Rfl: 3  •  Melatonin 3 MG capsule, Take 1 capsule by mouth At Night As Needed., Disp: , Rfl:   •  meloxicam (MOBIC) 15 MG tablet, TAKE ONE TABLET BY MOUTH DAILY, Disp: 30 tablet, Rfl: 1  •  metFORMIN ER (GLUCOPHAGE-XR) 500 MG 24 hr tablet, Take 1 tablet by mouth Daily With Breakfast., Disp: 90 tablet, Rfl: 3  •  Multiple Vitamins-Minerals (ICAPS PO), Take 1 capsule by mouth daily., Disp: , Rfl:   •  omeprazole (priLOSEC) 20 MG capsule, TAKE ONE CAPSULE BY MOUTH DAILY, Disp: 90 capsule, Rfl: 0  •  polyethylene glycol (MIRALAX) powder, Take 17 g by mouth Daily As Needed., Disp: , Rfl:   •  pravastatin (PRAVACHOL) 20 MG tablet, TAKE ONE TABLET BY MOUTH DAILY, Disp: 90 tablet, Rfl: 3  •  traMADol (ULTRAM) 50 MG tablet, Take 1 tablet by mouth Every 6 (Six) Hours As Needed for Severe Pain., Disp: 15 tablet, Rfl: 0    Allergies:   Allergies   Allergen Reactions   • Trazodone      hangover       I have reviewed and updated the patient's chief complaint, history of present illness, review of systems, past medical history, surgical history, family history, social history, medications and allergy list as appropriate.     Objective    Vital Signs:   Vitals:    01/19/23 1112   BP: 152/74   Weight: 98.9 kg (218 lb)   Height: 172.7 cm (67.99\")     Body mass index is 33.16 kg/m².  BMI is >= 30 and <35. (Class 1 Obesity). The following options were offered after discussion;: exercise counseling/recommendations and nutrition counseling/recommendations    Patient is a current everyday smoker of cigarettes and pipe.  He has a pack year history of 42.  In this visit the patient was advised to stop smoking and was offered tobacco cessation measures and resources, including NRT and/or medication intervention. At least 3 minutes was spent on face-to-face counseling regarding smoking " cessation.    Ortho Exam:  Left hip: No erythema ecchymosis or swelling.  No tenderness to palpation at the lateral aspect of the hip.  Limited range of motion in flexion extension secondary to pain.  - Tayla with pain laterally.  5/5 strength.  Sensation intact to light touch.    Results Review:   Imaging Results (Last 24 Hours)     ** No results found for the last 24 hours. **          Procedures    Assessment / Plan    Assessment/Plan:   Diagnoses and all orders for this visit:    1. Type 2 diabetes mellitus with hyperglycemia, without long-term current use of insulin (HCC) (Primary)    2. Greater trochanteric bursitis of left hip    3. Primary osteoarthritis of left hip      Symptoms greatly improved with physical therapy and meloxicam for greater trochanteric bursitis.  He has minimal pain laterally.  He has no pain today in the groin from his hip arthritis.  He had a good response in the past with Toradol injection to the lateral hip.  I discussed with him that he should continue/finish therapy but continue stretches from home.  Medication as needed for any exacerbation.  If this does not control, he can follow-up as needed for repeat injection or evaluation.  I advised him to maintain good glycemic control as corticosteroid may be needed for injection in the future. His current A1c is 7.0%. Otherwise, follow-up as needed.    Follow Up:   Return if symptoms worsen or fail to improve.      Albaro Guzman MD  [facility]

## 2023-01-23 ENCOUNTER — TREATMENT (OUTPATIENT)
Dept: PHYSICAL THERAPY | Facility: CLINIC | Age: 88
End: 2023-01-23
Payer: MEDICARE

## 2023-01-23 DIAGNOSIS — M25.552 LEFT HIP PAIN: Primary | ICD-10-CM

## 2023-01-23 PROCEDURE — 97110 THERAPEUTIC EXERCISES: CPT | Performed by: PHYSICAL THERAPIST

## 2023-01-23 PROCEDURE — 97530 THERAPEUTIC ACTIVITIES: CPT | Performed by: PHYSICAL THERAPIST

## 2023-01-23 NOTE — PROGRESS NOTES
Physical Therapy Daily Progress Note    Patient: Vic Rahman   : 1935  Diagnosis/ICD-10 Code:  Left hip pain [M25.552]  Referring practitioner: Jona Guzman MD  Date of Initial Visit: Type: THERAPY  Noted: 2022  Today's Date: 2023  Patient seen for 4 sessions         Vic Rahman reports no longer having hip pain.  Is able to walk without hip pain.  Getting in and out of car is not painful.  Still using a cane to maintain safety while walking as he feels like his balance is improving.  His legs feels strong.    Subjective     Objective   See Exercise, Manual, and Modality Logs for complete treatment.     *LEFS:  50/80 ()  *Active left hip flexion (sitting):  45 deg  *Passive hip internal rotation (sitting):  35 deg    Assessment/Plan  Mr. Rahman has attended physical therapy for a total of 4 visits for chronic lateral left hip pain.  Has no pain with active hip flexion nor ambulation.  Has improved left hip mobility in all planes.  No pain with prescribed exercises.  Will discharge Mr. Rahman from physical therapy and allow him to work on home exercises.  Will be happy to see Mr. Rahman if any symptoms return.         Manual Therapy:         mins  00428;  Therapeutic Exercise:    15     mins  [id number];     Neuromuscular Antonina:        mins  55973;    Therapeutic Activity:     10     mins  20105;     Gait Training:           mins  71657;     Ultrasound:          mins  03324;    Electrical Stimulation:         mins  78485 ( );  Dry Needling          mins self-pay    Timed Treatment:   25   mins   Total Treatment:     25   mins    Héctor Javed, PT  Physical Therapist

## 2023-02-02 ENCOUNTER — LAB (OUTPATIENT)
Dept: LAB | Facility: HOSPITAL | Age: 88
End: 2023-02-02
Payer: MEDICARE

## 2023-02-02 ENCOUNTER — OFFICE VISIT (OUTPATIENT)
Dept: FAMILY MEDICINE CLINIC | Facility: CLINIC | Age: 88
End: 2023-02-02
Payer: MEDICARE

## 2023-02-02 VITALS
TEMPERATURE: 97.3 F | HEIGHT: 68 IN | HEART RATE: 86 BPM | OXYGEN SATURATION: 93 % | SYSTOLIC BLOOD PRESSURE: 130 MMHG | WEIGHT: 222.2 LBS | BODY MASS INDEX: 33.68 KG/M2 | DIASTOLIC BLOOD PRESSURE: 82 MMHG

## 2023-02-02 DIAGNOSIS — E78.00 PURE HYPERCHOLESTEROLEMIA: ICD-10-CM

## 2023-02-02 DIAGNOSIS — I10 PRIMARY HYPERTENSION: ICD-10-CM

## 2023-02-02 DIAGNOSIS — E03.4 HYPOTHYROIDISM DUE TO ACQUIRED ATROPHY OF THYROID: ICD-10-CM

## 2023-02-02 DIAGNOSIS — Z00.00 PREVENTATIVE HEALTH CARE: ICD-10-CM

## 2023-02-02 DIAGNOSIS — Z00.00 MEDICARE ANNUAL WELLNESS VISIT, SUBSEQUENT: ICD-10-CM

## 2023-02-02 DIAGNOSIS — H91.93 BILATERAL HEARING LOSS, UNSPECIFIED HEARING LOSS TYPE: ICD-10-CM

## 2023-02-02 DIAGNOSIS — E11.65 TYPE 2 DIABETES MELLITUS WITH HYPERGLYCEMIA, WITHOUT LONG-TERM CURRENT USE OF INSULIN: Primary | ICD-10-CM

## 2023-02-02 DIAGNOSIS — R07.89 CHEST WALL PAIN: ICD-10-CM

## 2023-02-02 LAB
EXPIRATION DATE: NORMAL
HBA1C MFR BLD: 7.1 %
Lab: NORMAL

## 2023-02-02 PROCEDURE — 83036 HEMOGLOBIN GLYCOSYLATED A1C: CPT | Performed by: FAMILY MEDICINE

## 2023-02-02 PROCEDURE — 1125F AMNT PAIN NOTED PAIN PRSNT: CPT | Performed by: FAMILY MEDICINE

## 2023-02-02 PROCEDURE — 96160 PT-FOCUSED HLTH RISK ASSMT: CPT | Performed by: FAMILY MEDICINE

## 2023-02-02 PROCEDURE — 3051F HG A1C>EQUAL 7.0%<8.0%: CPT | Performed by: FAMILY MEDICINE

## 2023-02-02 PROCEDURE — 1159F MED LIST DOCD IN RCRD: CPT | Performed by: FAMILY MEDICINE

## 2023-02-02 PROCEDURE — 1170F FXNL STATUS ASSESSED: CPT | Performed by: FAMILY MEDICINE

## 2023-02-02 PROCEDURE — 99397 PER PM REEVAL EST PAT 65+ YR: CPT | Performed by: FAMILY MEDICINE

## 2023-02-02 PROCEDURE — G0439 PPPS, SUBSEQ VISIT: HCPCS | Performed by: FAMILY MEDICINE

## 2023-02-02 NOTE — PATIENT INSTRUCTIONS
A1c today 7.1%    Medicare Wellness  Personal Prevention Plan of Service     Date of Office Visit:    Encounter Provider:  Taqueria Casarez DO  Place of Service:  [facility] PRIMARY [facility]ARE  Patient Name: Vic Rahman  :  1935    As part of the Medicare Wellness portion of your visit today, we are providing you with this personalized preventive plan of services (PPPS). This plan is based upon recommendations of the [place] Preventive Services Task Force (USPSTF) and the Advisory Committee on Immunization Practices (ACIP).    This lists the preventive care services that should be considered, and provides dates of when you are due. Items listed as completed are up-to-date and do not require any further intervention.    Health Maintenance   Topic Date Due    INFLUENZA VACCINE  2022    LIPID PANEL  2022    URINE MICROALBUMIN  2022    TDAP/TD VACCINES (4 - Td or Tdap) 2023    HEMOGLOBIN A1C  2023    DIABETIC EYE EXAM  2023    ANNUAL WELLNESS VISIT  2024    COVID-19 Vaccine  Completed    Pneumococcal Vaccine 65+  Completed    ZOSTER VACCINE  Completed       Orders Placed This Encounter   Procedures    POC Glycosylated Hemoglobin (Hb A1C)     Order Specific Question:   Release to patient     Answer:   Routine Release       No follow-ups on file.
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
## 2023-02-03 LAB
ALBUMIN SERPL-MCNC: 4.3 G/DL (ref 3.5–5.2)
ALBUMIN/GLOB SERPL: 2.5 G/DL
ALP SERPL-CCNC: 78 U/L (ref 39–117)
ALT SERPL-CCNC: 13 U/L (ref 1–41)
APPEARANCE UR: CLEAR
AST SERPL-CCNC: 13 U/L (ref 1–40)
BILIRUB SERPL-MCNC: 0.3 MG/DL (ref 0–1.2)
BILIRUB UR QL STRIP: NEGATIVE
BUN SERPL-MCNC: 19 MG/DL (ref 8–23)
BUN/CREAT SERPL: 21.8 (ref 7–25)
CALCIUM SERPL-MCNC: 9.5 MG/DL (ref 8.6–10.5)
CHLORIDE SERPL-SCNC: 104 MMOL/L (ref 98–107)
CHOLEST SERPL-MCNC: 138 MG/DL (ref 0–200)
CO2 SERPL-SCNC: 23.3 MMOL/L (ref 22–29)
COLOR UR: YELLOW
CREAT SERPL-MCNC: 0.87 MG/DL (ref 0.76–1.27)
EGFRCR SERPLBLD CKD-EPI 2021: 84 ML/MIN/1.73
GLOBULIN SER CALC-MCNC: 1.7 GM/DL
GLUCOSE SERPL-MCNC: 178 MG/DL (ref 65–99)
GLUCOSE UR QL STRIP: NEGATIVE
HDLC SERPL-MCNC: 60 MG/DL (ref 40–60)
HGB UR QL STRIP: NEGATIVE
KETONES UR QL STRIP: ABNORMAL
LDLC SERPL CALC-MCNC: 58 MG/DL (ref 0–100)
LEUKOCYTE ESTERASE UR QL STRIP: NEGATIVE
MICRO URNS: ABNORMAL
NITRITE UR QL STRIP: NEGATIVE
PH UR STRIP: 5.5 [PH] (ref 5–7.5)
POTASSIUM SERPL-SCNC: 4.9 MMOL/L (ref 3.5–5.2)
PROT SERPL-MCNC: 6 G/DL (ref 6–8.5)
PROT UR QL STRIP: ABNORMAL
SODIUM SERPL-SCNC: 140 MMOL/L (ref 136–145)
SP GR UR STRIP: 1.02 (ref 1–1.03)
TRIGL SERPL-MCNC: 110 MG/DL (ref 0–150)
TSH SERPL DL<=0.005 MIU/L-ACNC: 0.6 UIU/ML (ref 0.27–4.2)
UROBILINOGEN UR STRIP-MCNC: 0.2 MG/DL (ref 0.2–1)
VLDLC SERPL CALC-MCNC: 20 MG/DL (ref 5–40)

## 2023-03-10 DIAGNOSIS — E03.4 HYPOTHYROIDISM DUE TO ACQUIRED ATROPHY OF THYROID: ICD-10-CM

## 2023-03-10 RX ORDER — LEVOTHYROXINE SODIUM 0.15 MG/1
TABLET ORAL
Qty: 90 TABLET | Refills: 0 | Status: SHIPPED | OUTPATIENT
Start: 2023-03-10

## 2023-03-14 DIAGNOSIS — I10 PRIMARY HYPERTENSION: ICD-10-CM

## 2023-03-14 RX ORDER — LOSARTAN POTASSIUM 50 MG/1
TABLET ORAL
Qty: 90 TABLET | Refills: 3 | Status: SHIPPED | OUTPATIENT
Start: 2023-03-14

## 2023-04-03 RX ORDER — OMEPRAZOLE 20 MG/1
CAPSULE, DELAYED RELEASE ORAL
Qty: 90 CAPSULE | Refills: 0 | Status: SHIPPED | OUTPATIENT
Start: 2023-04-03

## 2023-06-05 DIAGNOSIS — E03.4 HYPOTHYROIDISM DUE TO ACQUIRED ATROPHY OF THYROID: ICD-10-CM

## 2023-06-05 DIAGNOSIS — E11.9 TYPE 2 DIABETES MELLITUS WITHOUT COMPLICATION, WITHOUT LONG-TERM CURRENT USE OF INSULIN: ICD-10-CM

## 2023-06-05 RX ORDER — METFORMIN HYDROCHLORIDE 500 MG/1
TABLET, EXTENDED RELEASE ORAL
Qty: 90 TABLET | Refills: 3 | Status: SHIPPED | OUTPATIENT
Start: 2023-06-05

## 2023-06-05 RX ORDER — LEVOTHYROXINE SODIUM 0.15 MG/1
TABLET ORAL
Qty: 90 TABLET | Refills: 0 | Status: SHIPPED | OUTPATIENT
Start: 2023-06-05

## 2023-06-05 NOTE — TELEPHONE ENCOUNTER
Rx Refill Note  Requested Prescriptions     Pending Prescriptions Disp Refills    metFORMIN ER (GLUCOPHAGE-XR) 500 MG 24 hr tablet [Pharmacy Med Name: metFORMIN HCL  MG TABLET] 90 tablet 3     Sig: TAKE ONE TABLET BY MOUTH DAILY WITH BREAKFAST    levothyroxine (SYNTHROID, LEVOTHROID) 150 MCG tablet [Pharmacy Med Name: LEVOTHYROXINE 150 MCG TABLET] 90 tablet 0     Sig: TAKE ONE TABLET BY MOUTH DAILY      Last office visit with prescribing clinician: 2/2/2023   Last telemedicine visit with prescribing clinician: Visit date not found   Next office visit with prescribing clinician: 8/2/2023                         Would you like a call back once the refill request has been completed: [] Yes [] No    If the office needs to give you a call back, can they leave a voicemail: [] Yes [] No    Xiomara Price MA  06/05/23, 13:52 EDT

## 2023-08-02 ENCOUNTER — OFFICE VISIT (OUTPATIENT)
Dept: FAMILY MEDICINE CLINIC | Facility: CLINIC | Age: 88
End: 2023-08-02
Payer: MEDICARE

## 2023-08-02 ENCOUNTER — LAB (OUTPATIENT)
Dept: LAB | Facility: HOSPITAL | Age: 88
End: 2023-08-02
Payer: MEDICARE

## 2023-08-02 VITALS
BODY MASS INDEX: 32.16 KG/M2 | DIASTOLIC BLOOD PRESSURE: 62 MMHG | WEIGHT: 212.2 LBS | SYSTOLIC BLOOD PRESSURE: 102 MMHG | HEIGHT: 68 IN

## 2023-08-02 DIAGNOSIS — K21.9 GASTROESOPHAGEAL REFLUX DISEASE WITHOUT ESOPHAGITIS: ICD-10-CM

## 2023-08-02 DIAGNOSIS — E11.65 TYPE 2 DIABETES MELLITUS WITH HYPERGLYCEMIA, WITHOUT LONG-TERM CURRENT USE OF INSULIN: Primary | ICD-10-CM

## 2023-08-02 DIAGNOSIS — E11.65 TYPE 2 DIABETES MELLITUS WITH HYPERGLYCEMIA, WITHOUT LONG-TERM CURRENT USE OF INSULIN: ICD-10-CM

## 2023-08-02 LAB
EXPIRATION DATE: NORMAL
HBA1C MFR BLD: 6.7 %
Lab: NORMAL

## 2023-08-02 RX ORDER — PANTOPRAZOLE SODIUM 20 MG/1
20 TABLET, DELAYED RELEASE ORAL DAILY
Qty: 90 TABLET | Refills: 0 | Status: SHIPPED | OUTPATIENT
Start: 2023-08-02

## 2023-08-03 LAB
ALBUMIN/CREAT UR: 5 MG/G CREAT (ref 0–29)
CREAT UR-MCNC: 116.6 MG/DL
MICROALBUMIN UR-MCNC: 5.4 UG/ML

## 2023-09-04 DIAGNOSIS — E03.4 HYPOTHYROIDISM DUE TO ACQUIRED ATROPHY OF THYROID: ICD-10-CM

## 2023-09-05 RX ORDER — LEVOTHYROXINE SODIUM 0.15 MG/1
TABLET ORAL
Qty: 90 TABLET | Refills: 0 | Status: SHIPPED | OUTPATIENT
Start: 2023-09-05

## 2023-09-29 RX ORDER — OMEPRAZOLE 20 MG/1
CAPSULE, DELAYED RELEASE ORAL
Qty: 90 CAPSULE | Refills: 1 | Status: SHIPPED | OUTPATIENT
Start: 2023-09-29

## 2023-09-29 NOTE — TELEPHONE ENCOUNTER
Rx Refill Note  Requested Prescriptions     Pending Prescriptions Disp Refills    omeprazole (priLOSEC) 20 MG capsule [Pharmacy Med Name: OMEPRAZOLE DR 20 MG CAPSULE] 90 capsule      Sig: TAKE 1 CAPSULE BY MOUTH DAILY      Last office visit with prescribing clinician: 8/2/2023   Last telemedicine visit with prescribing clinician: Visit date not found   Next office visit with prescribing clinician: 2/7/2024                         Would you like a call back once the refill request has been completed: [] Yes [] No    If the office needs to give you a call back, can they leave a voicemail: [] Yes [] No    April BRAYDON Roth  09/29/23, 08:40 EDT

## 2023-10-03 RX ORDER — PRAVASTATIN SODIUM 20 MG
TABLET ORAL
Qty: 90 TABLET | Refills: 1 | Status: SHIPPED | OUTPATIENT
Start: 2023-10-03

## 2023-10-03 NOTE — TELEPHONE ENCOUNTER
Rx Refill Note  Requested Prescriptions     Pending Prescriptions Disp Refills    pravastatin (PRAVACHOL) 20 MG tablet [Pharmacy Med Name: PRAVASTATIN SODIUM 20 MG TAB] 90 tablet 3     Sig: TAKE ONE TABLET BY MOUTH DAILY      Last office visit with prescribing clinician: 8/2/2023   Last telemedicine visit with prescribing clinician: Visit date not found   Next office visit with prescribing clinician: 2/7/2024                         Would you like a call back once the refill request has been completed: [] Yes [] No    If the office needs to give you a call back, can they leave a voicemail: [] Yes [] No    Марина Roth MA  10/03/23, 08:21 EDT

## 2023-10-31 DIAGNOSIS — K21.9 GASTROESOPHAGEAL REFLUX DISEASE WITHOUT ESOPHAGITIS: ICD-10-CM

## 2023-11-01 RX ORDER — PANTOPRAZOLE SODIUM 20 MG/1
20 TABLET, DELAYED RELEASE ORAL DAILY
Qty: 90 TABLET | Refills: 0 | OUTPATIENT
Start: 2023-11-01

## 2023-11-03 DIAGNOSIS — K21.9 GASTROESOPHAGEAL REFLUX DISEASE WITHOUT ESOPHAGITIS: ICD-10-CM

## 2023-11-03 RX ORDER — PANTOPRAZOLE SODIUM 20 MG/1
20 TABLET, DELAYED RELEASE ORAL DAILY
Qty: 90 TABLET | Refills: 0 | OUTPATIENT
Start: 2023-11-03

## 2023-11-06 ENCOUNTER — TELEPHONE (OUTPATIENT)
Dept: FAMILY MEDICINE CLINIC | Facility: CLINIC | Age: 88
End: 2023-11-06
Payer: MEDICARE

## 2023-11-06 NOTE — TELEPHONE ENCOUNTER
Pantoprazole is what I can tell has been denied, however it is not on the patients current meds list.    Please advise.

## 2023-11-06 NOTE — TELEPHONE ENCOUNTER
"  Caller: Vic Rahman \"CURT\"    Relationship: Self    Best call back number: [phone]     Which medication are you concerned about: PT STATED THAT HIS ACID REFLUX MEDICATION HAS BEEN DENIED, PT NOT SURE THE NAME OF RX BUT WILL LIKE TO KNOW WHY THE MEDICATION HAS DENIED.  "

## 2023-12-02 DIAGNOSIS — E03.4 HYPOTHYROIDISM DUE TO ACQUIRED ATROPHY OF THYROID: ICD-10-CM

## 2023-12-04 RX ORDER — LEVOTHYROXINE SODIUM 0.15 MG/1
TABLET ORAL
Qty: 90 TABLET | Refills: 0 | Status: SHIPPED | OUTPATIENT
Start: 2023-12-04

## 2024-01-02 ENCOUNTER — OFFICE VISIT (OUTPATIENT)
Dept: FAMILY MEDICINE CLINIC | Facility: CLINIC | Age: 89
End: 2024-01-02
Payer: MEDICARE

## 2024-01-02 VITALS
HEIGHT: 68 IN | HEART RATE: 74 BPM | OXYGEN SATURATION: 98 % | DIASTOLIC BLOOD PRESSURE: 70 MMHG | WEIGHT: 206.8 LBS | RESPIRATION RATE: 14 BRPM | BODY MASS INDEX: 31.34 KG/M2 | SYSTOLIC BLOOD PRESSURE: 130 MMHG

## 2024-01-02 DIAGNOSIS — J40 BRONCHITIS: Primary | ICD-10-CM

## 2024-01-02 DIAGNOSIS — H61.21 IMPACTED CERUMEN OF RIGHT EAR: ICD-10-CM

## 2024-01-02 PROBLEM — H61.309 ACQUIRED STENOSIS OF EXTERNAL EAR CANAL: Status: ACTIVE | Noted: 2017-09-09

## 2024-01-02 PROBLEM — M17.9 OSTEOARTHRITIS OF KNEE: Status: ACTIVE | Noted: 2017-10-04

## 2024-01-02 RX ORDER — AMOXICILLIN 875 MG/1
875 TABLET, COATED ORAL 2 TIMES DAILY
Qty: 14 TABLET | Refills: 0 | Status: SHIPPED | OUTPATIENT
Start: 2024-01-02 | End: 2024-01-09

## 2024-01-02 RX ORDER — METHYLPREDNISOLONE 4 MG/1
TABLET ORAL
Qty: 21 EACH | Refills: 0 | Status: SHIPPED | OUTPATIENT
Start: 2024-01-02

## 2024-01-02 RX ORDER — ALBUTEROL SULFATE 90 UG/1
2 AEROSOL, METERED RESPIRATORY (INHALATION) EVERY 4 HOURS PRN
Qty: 17 G | Refills: 1 | Status: SHIPPED | OUTPATIENT
Start: 2024-01-02
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
## 2024-01-02 NOTE — ASSESSMENT & PLAN NOTE
Will start amoxicillin, Medrol Dosepak, and albuterol inhaler as needed.  Patient is currently negative for COVID and flu.  Recommended supportive care, adequate hydration, and stressed the importance of healthy hygiene habits to avoid spread (I.e. hand washing, social distancing) Advised patient to call clinic if they develop fever or if symptoms worsen/persist.

## 2024-02-14 ENCOUNTER — LAB (OUTPATIENT)
Dept: LAB | Facility: HOSPITAL | Age: 89
End: 2024-02-14
Payer: MEDICARE

## 2024-02-14 ENCOUNTER — OFFICE VISIT (OUTPATIENT)
Dept: FAMILY MEDICINE CLINIC | Facility: CLINIC | Age: 89
End: 2024-02-14
Payer: MEDICARE

## 2024-02-14 VITALS
SYSTOLIC BLOOD PRESSURE: 120 MMHG | OXYGEN SATURATION: 100 % | DIASTOLIC BLOOD PRESSURE: 80 MMHG | HEART RATE: 69 BPM | HEIGHT: 68 IN | BODY MASS INDEX: 31.27 KG/M2 | WEIGHT: 206.3 LBS

## 2024-02-14 DIAGNOSIS — E03.4 HYPOTHYROIDISM DUE TO ACQUIRED ATROPHY OF THYROID: ICD-10-CM

## 2024-02-14 DIAGNOSIS — E11.65 TYPE 2 DIABETES MELLITUS WITH HYPERGLYCEMIA, WITHOUT LONG-TERM CURRENT USE OF INSULIN: Primary | ICD-10-CM

## 2024-02-14 DIAGNOSIS — Z87.891 PERSONAL HISTORY OF TOBACCO USE, PRESENTING HAZARDS TO HEALTH: ICD-10-CM

## 2024-02-14 DIAGNOSIS — E78.00 PURE HYPERCHOLESTEROLEMIA: ICD-10-CM

## 2024-02-14 DIAGNOSIS — M17.32 POST-TRAUMATIC OSTEOARTHRITIS OF LEFT KNEE: ICD-10-CM

## 2024-02-14 DIAGNOSIS — H91.93 BILATERAL HEARING LOSS, UNSPECIFIED HEARING LOSS TYPE: ICD-10-CM

## 2024-02-14 DIAGNOSIS — Z13.0 SCREENING FOR DEFICIENCY ANEMIA: ICD-10-CM

## 2024-02-14 DIAGNOSIS — E11.65 TYPE 2 DIABETES MELLITUS WITH HYPERGLYCEMIA, WITHOUT LONG-TERM CURRENT USE OF INSULIN: ICD-10-CM

## 2024-02-14 DIAGNOSIS — Z00.00 MEDICARE ANNUAL WELLNESS VISIT, SUBSEQUENT: ICD-10-CM

## 2024-02-14 DIAGNOSIS — I10 PRIMARY HYPERTENSION: ICD-10-CM

## 2024-02-14 LAB
EXPIRATION DATE: ABNORMAL
HBA1C MFR BLD: 7 % (ref 4.5–5.7)
Lab: ABNORMAL

## 2024-02-14 RX ORDER — LEVOTHYROXINE SODIUM 0.15 MG/1
150 TABLET ORAL DAILY
Qty: 90 TABLET | Refills: 1 | Status: SHIPPED | OUTPATIENT
Start: 2024-02-14

## 2024-02-14 RX ORDER — PRAVASTATIN SODIUM 20 MG
20 TABLET ORAL DAILY
Qty: 90 TABLET | Refills: 1 | Status: SHIPPED | OUTPATIENT
Start: 2024-02-14

## 2024-02-14 RX ORDER — LOSARTAN POTASSIUM 50 MG/1
50 TABLET ORAL EVERY MORNING
Qty: 90 TABLET | Refills: 3 | Status: SHIPPED | OUTPATIENT
Start: 2024-02-14

## 2024-02-15 LAB
ALBUMIN SERPL-MCNC: 4 G/DL (ref 3.5–5.2)
ALBUMIN/CREAT UR: 6 MG/G CREAT (ref 0–29)
ALBUMIN/GLOB SERPL: 1.7 G/DL
ALP SERPL-CCNC: 54 U/L (ref 39–117)
ALT SERPL-CCNC: 10 U/L (ref 1–41)
APPEARANCE UR: CLEAR
AST SERPL-CCNC: 12 U/L (ref 1–40)
BILIRUB SERPL-MCNC: 0.4 MG/DL (ref 0–1.2)
BILIRUB UR QL STRIP: NEGATIVE
BUN SERPL-MCNC: 15 MG/DL (ref 8–23)
BUN/CREAT SERPL: 17 (ref 7–25)
CALCIUM SERPL-MCNC: 9.1 MG/DL (ref 8.6–10.5)
CHLORIDE SERPL-SCNC: 103 MMOL/L (ref 98–107)
CHOLEST SERPL-MCNC: 119 MG/DL (ref 0–200)
CO2 SERPL-SCNC: 21.2 MMOL/L (ref 22–29)
COLOR UR: YELLOW
CREAT SERPL-MCNC: 0.88 MG/DL (ref 0.76–1.27)
CREAT UR-MCNC: 125.6 MG/DL
EGFRCR SERPLBLD CKD-EPI 2021: 83.2 ML/MIN/1.73
ERYTHROCYTE [DISTWIDTH] IN BLOOD BY AUTOMATED COUNT: 13.1 % (ref 12.3–15.4)
GLOBULIN SER CALC-MCNC: 2.3 GM/DL
GLUCOSE SERPL-MCNC: 174 MG/DL (ref 65–99)
GLUCOSE UR QL STRIP: NEGATIVE
HCT VFR BLD AUTO: 40.7 % (ref 37.5–51)
HDLC SERPL-MCNC: 61 MG/DL (ref 40–60)
HGB BLD-MCNC: 13.9 G/DL (ref 13–17.7)
HGB UR QL STRIP: NEGATIVE
KETONES UR QL STRIP: NEGATIVE
LDLC SERPL CALC-MCNC: 42 MG/DL (ref 0–100)
LEUKOCYTE ESTERASE UR QL STRIP: NEGATIVE
MCH RBC QN AUTO: 30.5 PG (ref 26.6–33)
MCHC RBC AUTO-ENTMCNC: 34.2 G/DL (ref 31.5–35.7)
MCV RBC AUTO: 89.5 FL (ref 79–97)
MICRO URNS: NORMAL
MICROALBUMIN UR-MCNC: 7.9 UG/ML
NITRITE UR QL STRIP: NEGATIVE
PH UR STRIP: 6 [PH] (ref 5–7.5)
PLATELET # BLD AUTO: 137 10*3/MM3 (ref 140–450)
POTASSIUM SERPL-SCNC: 4.6 MMOL/L (ref 3.5–5.2)
PROT SERPL-MCNC: 6.3 G/DL (ref 6–8.5)
PROT UR QL STRIP: NEGATIVE
RBC # BLD AUTO: 4.55 10*6/MM3 (ref 4.14–5.8)
SODIUM SERPL-SCNC: 140 MMOL/L (ref 136–145)
SP GR UR STRIP: 1.02 (ref 1–1.03)
TRIGL SERPL-MCNC: 82 MG/DL (ref 0–150)
TSH SERPL DL<=0.005 MIU/L-ACNC: 0.11 UIU/ML (ref 0.27–4.2)
UROBILINOGEN UR STRIP-MCNC: 0.2 MG/DL (ref 0.2–1)
VLDLC SERPL CALC-MCNC: 16 MG/DL (ref 5–40)
WBC # BLD AUTO: 4.24 10*3/MM3 (ref 3.4–10.8)

## 2024-03-25 RX ORDER — OMEPRAZOLE 20 MG/1
CAPSULE, DELAYED RELEASE ORAL
Qty: 90 CAPSULE | Refills: 1 | Status: SHIPPED | OUTPATIENT
Start: 2024-03-25

## 2024-05-31 DIAGNOSIS — E11.9 TYPE 2 DIABETES MELLITUS WITHOUT COMPLICATION, WITHOUT LONG-TERM CURRENT USE OF INSULIN: ICD-10-CM

## 2024-05-31 RX ORDER — METFORMIN HYDROCHLORIDE 500 MG/1
TABLET, EXTENDED RELEASE ORAL
Qty: 90 TABLET | Refills: 0 | Status: SHIPPED | OUTPATIENT
Start: 2024-05-31

## 2024-08-13 DIAGNOSIS — E03.4 HYPOTHYROIDISM DUE TO ACQUIRED ATROPHY OF THYROID: ICD-10-CM

## 2024-08-13 RX ORDER — LEVOTHYROXINE SODIUM 0.15 MG/1
150 TABLET ORAL DAILY
Qty: 90 TABLET | Refills: 1 | Status: SHIPPED | OUTPATIENT
Start: 2024-08-13

## 2024-08-14 ENCOUNTER — LAB (OUTPATIENT)
Dept: LAB | Facility: HOSPITAL | Age: 89
End: 2024-08-14
Payer: MEDICARE

## 2024-08-14 ENCOUNTER — OFFICE VISIT (OUTPATIENT)
Dept: FAMILY MEDICINE CLINIC | Facility: CLINIC | Age: 89
End: 2024-08-14
Payer: MEDICARE

## 2024-08-14 VITALS
HEIGHT: 68 IN | WEIGHT: 210 LBS | HEART RATE: 70 BPM | OXYGEN SATURATION: 98 % | BODY MASS INDEX: 31.83 KG/M2 | DIASTOLIC BLOOD PRESSURE: 80 MMHG | SYSTOLIC BLOOD PRESSURE: 118 MMHG

## 2024-08-14 DIAGNOSIS — D69.6 LOW PLATELET COUNT: ICD-10-CM

## 2024-08-14 DIAGNOSIS — E11.9 TYPE 2 DIABETES MELLITUS WITHOUT COMPLICATION, WITHOUT LONG-TERM CURRENT USE OF INSULIN: Primary | ICD-10-CM

## 2024-08-14 DIAGNOSIS — J30.0 VASOMOTOR RHINITIS: ICD-10-CM

## 2024-08-14 DIAGNOSIS — E03.4 HYPOTHYROIDISM DUE TO ACQUIRED ATROPHY OF THYROID: ICD-10-CM

## 2024-08-14 LAB
DEPRECATED RDW RBC AUTO: 43.2 FL (ref 37–54)
ERYTHROCYTE [DISTWIDTH] IN BLOOD BY AUTOMATED COUNT: 13.2 % (ref 12.3–15.4)
EXPIRATION DATE: ABNORMAL
HBA1C MFR BLD: 7.2 % (ref 4.5–5.7)
HCT VFR BLD AUTO: 43.1 % (ref 37.5–51)
HGB BLD-MCNC: 14.5 G/DL (ref 13–17.7)
Lab: ABNORMAL
MCH RBC QN AUTO: 30.3 PG (ref 26.6–33)
MCHC RBC AUTO-ENTMCNC: 33.6 G/DL (ref 31.5–35.7)
MCV RBC AUTO: 90.2 FL (ref 79–97)
PLATELET # BLD AUTO: 166 10*3/MM3 (ref 140–450)
PMV BLD AUTO: 11.9 FL (ref 6–12)
RBC # BLD AUTO: 4.78 10*6/MM3 (ref 4.14–5.8)
WBC NRBC COR # BLD AUTO: 4.13 10*3/MM3 (ref 3.4–10.8)

## 2024-08-14 PROCEDURE — 83036 HEMOGLOBIN GLYCOSYLATED A1C: CPT | Performed by: FAMILY MEDICINE

## 2024-08-14 PROCEDURE — 36415 COLL VENOUS BLD VENIPUNCTURE: CPT

## 2024-08-14 PROCEDURE — 3051F HG A1C>EQUAL 7.0%<8.0%: CPT | Performed by: FAMILY MEDICINE

## 2024-08-14 PROCEDURE — 85027 COMPLETE CBC AUTOMATED: CPT

## 2024-08-14 PROCEDURE — 84443 ASSAY THYROID STIM HORMONE: CPT

## 2024-08-14 PROCEDURE — 99214 OFFICE O/P EST MOD 30 MIN: CPT | Performed by: FAMILY MEDICINE

## 2024-08-14 PROCEDURE — 1126F AMNT PAIN NOTED NONE PRSNT: CPT | Performed by: FAMILY MEDICINE

## 2024-08-14 RX ORDER — LATANOPROST 50 UG/ML
SOLUTION/ DROPS OPHTHALMIC
COMMUNITY
Start: 2024-07-17

## 2024-08-14 RX ORDER — METFORMIN HCL 500 MG
1000 TABLET, EXTENDED RELEASE 24 HR ORAL
Qty: 180 TABLET | Refills: 1 | Status: SHIPPED | OUTPATIENT
Start: 2024-08-14

## 2024-08-14 RX ORDER — IPRATROPIUM BROMIDE 42 UG/1
2 SPRAY, METERED NASAL 3 TIMES DAILY PRN
Qty: 15 ML | Refills: 1 | Status: SHIPPED | OUTPATIENT
Start: 2024-08-14

## 2024-08-14 NOTE — PATIENT INSTRUCTIONS
Your A1c is a little higher today, so I want you to increase your metformin to 2 tablets and try to cut down on the sweets a little

## 2024-08-14 NOTE — ASSESSMENT & PLAN NOTE
Stable, A1c 7.2% slightly up from 7.0% in February, but still at goal for age. Continue metformin, improve diet compliance (too many sweets). Increase metformin to 1000mg daily.

## 2024-08-15 LAB — TSH SERPL DL<=0.05 MIU/L-ACNC: 0.63 UIU/ML (ref 0.27–4.2)

## 2024-08-27 DIAGNOSIS — E11.9 TYPE 2 DIABETES MELLITUS WITHOUT COMPLICATION, WITHOUT LONG-TERM CURRENT USE OF INSULIN: ICD-10-CM

## 2024-08-27 RX ORDER — METFORMIN HCL 500 MG
500 TABLET, EXTENDED RELEASE 24 HR ORAL
Qty: 90 TABLET | OUTPATIENT
Start: 2024-08-27

## 2024-08-27 NOTE — TELEPHONE ENCOUNTER
Rx Refill Note  Requested Prescriptions     Pending Prescriptions Disp Refills    metFORMIN ER (GLUCOPHAGE-XR) 500 MG 24 hr tablet [Pharmacy Med Name: METFORMIN HCL  MG TABLET] 90 tablet      Sig: TAKE ONE TABLET BY MOUTH DAILY WITH BREAKFAST      Last office visit with prescribing clinician: 8/14/2024   Last telemedicine visit with prescribing clinician: Visit date not found   Next office visit with prescribing clinician: 2/18/2025                         Would you like a call back once the refill request has been completed: [] Yes [] No    If the office needs to give you a call back, can they leave a voicemail: [] Yes [] No    April BRAYDON Roth  08/27/24, 09:58 EDT
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
## 2024-09-22 DIAGNOSIS — E78.00 PURE HYPERCHOLESTEROLEMIA: ICD-10-CM

## 2024-09-23 RX ORDER — PRAVASTATIN SODIUM 20 MG
20 TABLET ORAL DAILY
Qty: 90 TABLET | Refills: 1 | Status: SHIPPED | OUTPATIENT
Start: 2024-09-23

## 2025-02-08 DIAGNOSIS — E03.4 HYPOTHYROIDISM DUE TO ACQUIRED ATROPHY OF THYROID: ICD-10-CM

## 2025-02-08 DIAGNOSIS — E11.9 TYPE 2 DIABETES MELLITUS WITHOUT COMPLICATION, WITHOUT LONG-TERM CURRENT USE OF INSULIN: ICD-10-CM

## 2025-02-08 RX ORDER — METFORMIN HYDROCHLORIDE 500 MG/1
1000 TABLET, EXTENDED RELEASE ORAL
Qty: 180 TABLET | Refills: 1 | Status: SHIPPED | OUTPATIENT
Start: 2025-02-08

## 2025-02-08 RX ORDER — LEVOTHYROXINE SODIUM 150 UG/1
150 TABLET ORAL DAILY
Qty: 90 TABLET | Refills: 1 | Status: SHIPPED | OUTPATIENT
Start: 2025-02-08

## 2025-02-11 ENCOUNTER — LAB (OUTPATIENT)
Dept: LAB | Facility: HOSPITAL | Age: 89
End: 2025-02-11
Payer: MEDICARE

## 2025-02-11 DIAGNOSIS — E11.9 TYPE 2 DIABETES MELLITUS WITHOUT COMPLICATION, WITHOUT LONG-TERM CURRENT USE OF INSULIN: ICD-10-CM

## 2025-02-11 DIAGNOSIS — E03.4 HYPOTHYROIDISM DUE TO ACQUIRED ATROPHY OF THYROID: ICD-10-CM

## 2025-02-11 DIAGNOSIS — D69.6 LOW PLATELET COUNT: ICD-10-CM

## 2025-02-11 DIAGNOSIS — Z00.00 MEDICARE ANNUAL WELLNESS VISIT, SUBSEQUENT: ICD-10-CM

## 2025-02-11 DIAGNOSIS — Z00.00 MEDICARE ANNUAL WELLNESS VISIT, SUBSEQUENT: Primary | ICD-10-CM

## 2025-02-11 LAB
ALBUMIN SERPL-MCNC: 4 G/DL (ref 3.5–5.2)
ALBUMIN/GLOB SERPL: 1.3 G/DL
ALP SERPL-CCNC: 50 U/L (ref 39–117)
ALT SERPL W P-5'-P-CCNC: 12 U/L (ref 1–41)
ANION GAP SERPL CALCULATED.3IONS-SCNC: 11.3 MMOL/L (ref 5–15)
AST SERPL-CCNC: 20 U/L (ref 1–40)
BASOPHILS # BLD AUTO: 0.03 10*3/MM3 (ref 0–0.2)
BASOPHILS NFR BLD AUTO: 0.6 % (ref 0–1.5)
BILIRUB SERPL-MCNC: 0.5 MG/DL (ref 0–1.2)
BUN SERPL-MCNC: 16 MG/DL (ref 8–23)
BUN/CREAT SERPL: 17 (ref 7–25)
CALCIUM SPEC-SCNC: 9.7 MG/DL (ref 8.6–10.5)
CHLORIDE SERPL-SCNC: 103 MMOL/L (ref 98–107)
CHOLEST SERPL-MCNC: 116 MG/DL (ref 0–200)
CO2 SERPL-SCNC: 23.7 MMOL/L (ref 22–29)
CREAT SERPL-MCNC: 0.94 MG/DL (ref 0.76–1.27)
DEPRECATED RDW RBC AUTO: 41.2 FL (ref 37–54)
EGFRCR SERPLBLD CKD-EPI 2021: 78 ML/MIN/1.73
EOSINOPHIL # BLD AUTO: 0.22 10*3/MM3 (ref 0–0.4)
EOSINOPHIL NFR BLD AUTO: 4.5 % (ref 0.3–6.2)
ERYTHROCYTE [DISTWIDTH] IN BLOOD BY AUTOMATED COUNT: 12.6 % (ref 12.3–15.4)
GLOBULIN UR ELPH-MCNC: 3.2 GM/DL
GLUCOSE SERPL-MCNC: 132 MG/DL (ref 65–99)
HBA1C MFR BLD: 7.1 % (ref 4.8–5.6)
HCT VFR BLD AUTO: 44 % (ref 37.5–51)
HDLC SERPL QL: 2.11
HDLC SERPL-MCNC: 55 MG/DL (ref 40–60)
HGB BLD-MCNC: 15 G/DL (ref 13–17.7)
IMM GRANULOCYTES # BLD AUTO: 0.01 10*3/MM3 (ref 0–0.05)
IMM GRANULOCYTES NFR BLD AUTO: 0.2 % (ref 0–0.5)
LDLC SERPL CALC-MCNC: 46 MG/DL (ref 0–100)
LYMPHOCYTES # BLD AUTO: 1.47 10*3/MM3 (ref 0.7–3.1)
LYMPHOCYTES NFR BLD AUTO: 30.1 % (ref 19.6–45.3)
MCH RBC QN AUTO: 30.8 PG (ref 26.6–33)
MCHC RBC AUTO-ENTMCNC: 34.1 G/DL (ref 31.5–35.7)
MCV RBC AUTO: 90.3 FL (ref 79–97)
MONOCYTES # BLD AUTO: 0.49 10*3/MM3 (ref 0.1–0.9)
MONOCYTES NFR BLD AUTO: 10 % (ref 5–12)
NEUTROPHILS NFR BLD AUTO: 2.66 10*3/MM3 (ref 1.7–7)
NEUTROPHILS NFR BLD AUTO: 54.6 % (ref 42.7–76)
PLATELET # BLD AUTO: 146 10*3/MM3 (ref 140–450)
PMV BLD AUTO: 13.3 FL (ref 6–12)
POTASSIUM SERPL-SCNC: 4.7 MMOL/L (ref 3.5–5.2)
PROT SERPL-MCNC: 7.2 G/DL (ref 6–8.5)
RBC # BLD AUTO: 4.87 10*6/MM3 (ref 4.14–5.8)
SODIUM SERPL-SCNC: 138 MMOL/L (ref 136–145)
TRIGL SERPL-MCNC: 75 MG/DL (ref 0–150)
TSH SERPL DL<=0.05 MIU/L-ACNC: 0.43 UIU/ML (ref 0.27–4.2)
VLDLC SERPL-MCNC: 15 MG/DL (ref 5–40)
WBC NRBC COR # BLD AUTO: 4.88 10*3/MM3 (ref 3.4–10.8)

## 2025-02-11 PROCEDURE — 84443 ASSAY THYROID STIM HORMONE: CPT

## 2025-02-11 PROCEDURE — 83036 HEMOGLOBIN GLYCOSYLATED A1C: CPT

## 2025-02-11 PROCEDURE — 80061 LIPID PANEL: CPT

## 2025-02-11 PROCEDURE — 85025 COMPLETE CBC W/AUTO DIFF WBC: CPT

## 2025-02-11 PROCEDURE — 80053 COMPREHEN METABOLIC PANEL: CPT

## 2025-02-12 ENCOUNTER — LAB (OUTPATIENT)
Dept: LAB | Facility: HOSPITAL | Age: 89
End: 2025-02-12
Payer: MEDICARE

## 2025-02-12 DIAGNOSIS — I10 PRIMARY HYPERTENSION: ICD-10-CM

## 2025-02-12 DIAGNOSIS — E11.9 TYPE 2 DIABETES MELLITUS WITHOUT COMPLICATION, WITHOUT LONG-TERM CURRENT USE OF INSULIN: ICD-10-CM

## 2025-02-12 DIAGNOSIS — Z00.00 MEDICARE ANNUAL WELLNESS VISIT, SUBSEQUENT: ICD-10-CM

## 2025-02-12 LAB
ALBUMIN UR-MCNC: <1.2 MG/DL
CREAT UR-MCNC: 102.8 MG/DL
MICROALBUMIN/CREAT UR: NORMAL MG/G{CREAT}

## 2025-02-12 PROCEDURE — 82570 ASSAY OF URINE CREATININE: CPT

## 2025-02-12 PROCEDURE — 82043 UR ALBUMIN QUANTITATIVE: CPT

## 2025-02-12 RX ORDER — LOSARTAN POTASSIUM 50 MG/1
50 TABLET ORAL EVERY MORNING
Qty: 90 TABLET | Refills: 0 | Status: SHIPPED | OUTPATIENT
Start: 2025-02-12

## 2025-02-18 ENCOUNTER — OFFICE VISIT (OUTPATIENT)
Dept: FAMILY MEDICINE CLINIC | Facility: CLINIC | Age: 89
End: 2025-02-18
Payer: MEDICARE

## 2025-02-18 VITALS
BODY MASS INDEX: 30.31 KG/M2 | SYSTOLIC BLOOD PRESSURE: 112 MMHG | HEIGHT: 68 IN | OXYGEN SATURATION: 100 % | DIASTOLIC BLOOD PRESSURE: 68 MMHG | WEIGHT: 200 LBS | HEART RATE: 55 BPM

## 2025-02-18 DIAGNOSIS — C61 PROSTATE CANCER: ICD-10-CM

## 2025-02-18 DIAGNOSIS — H90.0 CONDUCTIVE HEARING LOSS, BILATERAL: ICD-10-CM

## 2025-02-18 DIAGNOSIS — M17.32 POST-TRAUMATIC OSTEOARTHRITIS OF LEFT KNEE: ICD-10-CM

## 2025-02-18 DIAGNOSIS — I10 PRIMARY HYPERTENSION: ICD-10-CM

## 2025-02-18 DIAGNOSIS — E78.00 PURE HYPERCHOLESTEROLEMIA: ICD-10-CM

## 2025-02-18 DIAGNOSIS — Z00.00 MEDICARE ANNUAL WELLNESS VISIT, SUBSEQUENT: Primary | ICD-10-CM

## 2025-02-18 NOTE — PROGRESS NOTES
Subjective   The ABCs of the Annual Wellness Visit  Medicare Wellness Visit      Vic Rahman is a 89 y.o. patient who presents for a Medicare Wellness Visit.    The following portions of the patient's history were reviewed and   updated as appropriate: allergies, current medications, past family history, past medical history, past social history, past surgical history, and problem list.    Compared to one year ago, the patient's physical   health is the same.  Compared to one year ago, the patient's mental   health is the same.    Recent Hospitalizations:  He was not admitted to the hospital during the last year.     Current Medical Providers:  Patient Care Team:  Taqueria Casarez DO as PCP - General (Family Medicine)  Jose Alejandro Sutton MD as Consulting Physician (Urology)    Outpatient Medications Prior to Visit   Medication Sig Dispense Refill    ipratropium (ATROVENT) 0.06 % nasal spray 2 sprays into the nostril(s) as directed by provider 3 (Three) Times a Day As Needed (runny nose). 15 mL 1    latanoprost (XALATAN) 0.005 % ophthalmic solution       levothyroxine (SYNTHROID, LEVOTHROID) 150 MCG tablet TAKE 1 TABLET BY MOUTH DAILY 90 tablet 1    losartan (COZAAR) 50 MG tablet TAKE 1 TABLET BY MOUTH EVERY MORNING 90 tablet 0    Melatonin 3 MG capsule Take 1 capsule by mouth At Night As Needed.      metFORMIN ER (GLUCOPHAGE-XR) 500 MG 24 hr tablet TAKE 2 TABLETS BY MOUTH DAILY WITH BREAKFAST 180 tablet 1    Multiple Vitamins-Minerals (ICAPS PO) Take 1 capsule by mouth daily.      omeprazole (priLOSEC) 20 MG capsule TAKE 1 CAPSULE BY MOUTH DAILY 90 capsule 1    polyethylene glycol (MIRALAX) powder Take 17 g by mouth Daily As Needed.      pravastatin (PRAVACHOL) 20 MG tablet TAKE 1 TABLET BY MOUTH DAILY 90 tablet 1    meloxicam (MOBIC) 15 MG tablet TAKE ONE TABLET BY MOUTH DAILY 30 tablet 1    Diclofenac Sodium (VOLTAREN) 1 % gel gel Apply 4 g topically to the appropriate area as directed 4 (Four) Times a Day  "As Needed (pain). (Patient not taking: Reported on 2/18/2025) 100 g 5     No facility-administered medications prior to visit.     No opioid medication identified on active medication list. I have reviewed chart for other potential  high risk medication/s and harmful drug interactions in the elderly.      Aspirin is not on active medication list.  Aspirin use is not indicated based on review of current medical condition/s. Risk of harm outweighs potential benefits.  .    Patient Active Problem List   Diagnosis    Depression    Type 2 diabetes mellitus    Gastroesophageal reflux disease    Conductive hearing loss, bilateral    Hyperlipidemia    Hypertension    Hypothyroidism    Knee pain    Mitral valve disease    Dyssomnia    Preventative health care    Kidney stone    Prostate cancer    Impacted cerumen    Personal history of tobacco use, presenting hazards to health    Chronic constipation    Recurrent major depressive disorder, in full remission    Mobitz type 1 second degree AV block    Acquired stenosis of external ear canal    Osteoarthritis of knee    Bronchitis     Advance Care Planning Advance Directive is on file.  ACP discussion was held with the patient during this visit. Patient has an advance directive in EMR which is still valid.             Objective   Vitals:    02/18/25 1034   BP: 112/68   Pulse: 55   SpO2: 100%   Weight: 90.7 kg (200 lb)   Height: 172.2 cm (67.8\")   PainSc: 0-No pain       Estimated body mass index is 30.59 kg/m² as calculated from the following:    Height as of this encounter: 172.2 cm (67.8\").    Weight as of this encounter: 90.7 kg (200 lb).    BMI is >= 30 and <35. (Class 1 Obesity). The following options were offered after discussion;: weight loss educational material (shared in after visit summary)           Does the patient have evidence of cognitive impairment? No  Lab Results   Component Value Date    TRIG 75 02/11/2025    HDL 55 02/11/2025    LDL 46 02/11/2025    VLDL 15 " 2025    HGBA1C 7.10 (H) 2025                                                                                                Health  Risk Assessment    Smoking Status:  Social History     Tobacco Use   Smoking Status Every Day    Current packs/day: 0.10    Average packs/day: 0.1 packs/day for 45.2 years (4.5 ttl pk-yrs)    Types: Pipe, Cigarettes    Start date:    Smokeless Tobacco Never   Tobacco Comments    Long-term use of type     Alcohol Consumption:  Social History     Substance and Sexual Activity   Alcohol Use No    Comment: rare Alcohol not monthly       Fall Risk Screen  STEADI Fall Risk Assessment was completed, and patient is at LOW risk for falls.Assessment completed on:2025    Depression Screening   Little interest or pleasure in doing things? Not at all   Feeling down, depressed, or hopeless? Not at all   PHQ-2 Total Score 0      Health Habits and Functional and Cognitive Screenin/18/2025    10:31 AM   Functional & Cognitive Status   Do you have difficulty preparing food and eating? No   Do you have difficulty bathing yourself, getting dressed or grooming yourself? No   Do you have difficulty using the toilet? No   Do you have difficulty moving around from place to place? Yes   Do you have trouble with steps or getting out of a bed or a chair? Yes   Current Diet Well Balanced Diet   Dental Exam Up to date   Eye Exam Up to date   Exercise (times per week) 3 times per week   Current Exercises Include Stationary Bicycling/Spin Class   Do you need help using the phone?  No   Are you deaf or do you have serious difficulty hearing?  Yes   Do you need help to go to places out of walking distance? No   Do you need help shopping? No   Do you need help preparing meals?  Yes   Do you need help with housework?  Yes   Do you need help with laundry? Yes   Do you need help taking your medications? No   Do you need help managing money? No   Do you ever drive or ride in a car without  wearing a seat belt? No   Have you felt unusual stress, anger or loneliness in the last month? No   Who do you live with? Alone   If you need help, do you have trouble finding someone available to you? No   Have you been bothered in the last four weeks by sexual problems? No   Do you have difficulty concentrating, remembering or making decisions? Yes           Age-appropriate Screening Schedule:  Refer to the list below for future screening recommendations based on patient's age, sex and/or medical conditions. Orders for these recommended tests are listed in the plan section. The patient has been provided with a written plan.    Health Maintenance List  Health Maintenance   Topic Date Due    TDAP/TD VACCINES (4 - Td or Tdap) 02/12/2023    DIABETIC EYE EXAM  12/14/2024    URINE MICROALBUMIN-CREATININE RATIO (uACR)  02/14/2025    BMI FOLLOWUP  02/14/2025    COVID-19 Vaccine (5 - 2024-25 season) 03/18/2025    HEMOGLOBIN A1C  08/11/2025    LIPID PANEL  02/11/2026    ANNUAL WELLNESS VISIT  02/18/2026    RSV Vaccine - Adults  Completed    INFLUENZA VACCINE  Completed    Pneumococcal Vaccine 50+  Completed    ZOSTER VACCINE  Completed                                                                                                                                                CMS Preventative Services Quick Reference  Risk Factors Identified During Encounter  None Identified    The above risks/problems have been discussed with the patient.  Pertinent information has been shared with the patient in the After Visit Summary.  An After Visit Summary and PPPS were made available to the patient.    Follow Up:   Next Medicare Wellness visit to be scheduled in 1 year.         Additional E&M Note during same encounter follows:  Patient has additional, significant, and separately identifiable condition(s)/problem(s) that require work above and beyond the Medicare Wellness Visit     Chief Complaint  Medicare  "Wellness-subsequent    Subjective   HPI  CURT is also being seen today for an annual adult preventative physical exam.                 Objective   Vital Signs:  /68   Pulse 55   Ht 172.2 cm (67.8\")   Wt 90.7 kg (200 lb)   SpO2 100%   BMI 30.59 kg/m²   Physical Exam  Const: NAD, A&Ox4, Pleasant, Cooperative  Eyes: EOMI, no conjunctivitis  ENT: No nasal discharge present, neck supple  Cardiac: Regular rate and rhythm, no cyanosis  Resp: Respiratory rate within normal limits, no increased work of breathing, no audible wheezing or retractions noted  GI: No distention or ascites  MSK: Motor and sensation grossly intact in bilateral upper extremities  Neurologic: CN II-XII grossly intact  Psych: Appropriate mood and behavior.  Skin: Pink, warm, dry                 Assessment and Plan            Medicare annual wellness visit, subsequent         Primary hypertension           Pure hypercholesterolemia            Conductive hearing loss, bilateral         Prostate cancer         Post-traumatic osteoarthritis of left knee            Problem List Items Addressed This Visit          Cardiac and Vasculature    Hyperlipidemia    Overview   Pravastatin 20 mg daily         Hypertension    Overview   Losartan 50 mg            ENT    Conductive hearing loss, bilateral    Overview   Has effective hearing aids at home, but does not wear them regularly due to inconvenience            Hematology and Neoplasia    Prostate cancer    Overview   Dr. Sutton every 6 months. Injection therapy            Musculoskeletal and Injuries    Osteoarthritis of knee    Overview   Bilateral, L>R. Ambulates with assistance of single-prong cane.          Other Visit Diagnoses         Medicare annual wellness visit, subsequent    -  Primary            The wellness exam has been reviewed in detail.  The patient has been fully counseled on preventative guidelines for vaccines, cancer screenings, and other health maintenance needs.  Functional testing " has been performed to assess capacity for independent living and need for other medical interventions.   The patient was counseled on maintaining a lifestyle to promote good health and to minimize chronic diseases.  The patient has been assisted with scheduling healthcare procedures for the coming year and given a written document outlining these recommendations.    Return in about 6 months (around 8/18/2025) for with A1c; microalbumin.        Follow Up   Return in about 6 months (around 8/18/2025) for with A1c; microalbumin.  Patient was given instructions and counseling regarding his condition or for health maintenance advice. Please see specific information pulled into the AVS if appropriate.

## 2025-02-18 NOTE — PATIENT INSTRUCTIONS
Goody's headache powders PM for sleep      You are due for adacel Tdap vaccination. (provides protection against tetanus, diptheria and whooping cough) Please  get the immunization at your local pharmacy at your earliest convenience.  Please click on the link for more information about this vaccine.    https://www.cdc.gov/vaccines/vpd/dtap-tdap-td/public/index.html      Medicare Wellness  Personal Prevention Plan of Service     Date of Office Visit:    Encounter Provider:  Taqueria Casarez DO  Place of Service:  [facility]  Patient Name: Vic Rahman  :  1935    As part of the Medicare Wellness portion of your visit today, we are providing you with this personalized preventive plan of services (PPPS). This plan is based upon recommendations of the [place] Preventive Services Task Force (USPSTF) and the Advisory Committee on Immunization Practices (ACIP).    This lists the preventive care services that should be considered, and provides dates of when you are due. Items listed as completed are up-to-date and do not require any further intervention.    Health Maintenance   Topic Date Due   • TDAP/TD VACCINES (4 - Td or Tdap) 2023   • DIABETIC EYE EXAM  2024   • ANNUAL WELLNESS VISIT  2025   • URINE MICROALBUMIN-CREATININE RATIO (uACR)  2025   • BMI FOLLOWUP  2025   • HEMOGLOBIN A1C  2025   • LIPID PANEL  2026   • COVID-19 Vaccine  Completed   • RSV Vaccine - Adults  Completed   • INFLUENZA VACCINE  Completed   • Pneumococcal Vaccine 50+  Completed   • ZOSTER VACCINE  Completed       No orders of the defined types were placed in this encounter.      Return in about 6 months (around 2025) for with A1c; microalbumin.

## 2025-03-20 DIAGNOSIS — E78.00 PURE HYPERCHOLESTEROLEMIA: ICD-10-CM

## 2025-03-20 RX ORDER — PRAVASTATIN SODIUM 20 MG
20 TABLET ORAL DAILY
Qty: 90 TABLET | Refills: 1 | Status: SHIPPED | OUTPATIENT
Start: 2025-03-20

## 2025-03-20 NOTE — TELEPHONE ENCOUNTER
Rx Refill Note  Requested Prescriptions     Pending Prescriptions Disp Refills    pravastatin (PRAVACHOL) 20 MG tablet [Pharmacy Med Name: PRAVASTATIN SODIUM 20 MG TAB] 90 tablet 1     Sig: TAKE 1 TABLET BY MOUTH DAILY      Last office visit with prescribing clinician: 2/18/2025   Last telemedicine visit with prescribing clinician: Visit date not found   Next office visit with prescribing clinician: 8/18/2025                         Would you like a call back once the refill request has been completed: [] Yes [] No    If the office needs to give you a call back, can they leave a voicemail: [] Yes [] No    Kathryn Deluna MA  03/20/25, 09:20 EDT

## 2025-03-21 RX ORDER — OMEPRAZOLE 20 MG/1
20 CAPSULE, DELAYED RELEASE ORAL DAILY
Qty: 90 CAPSULE | Refills: 1 | Status: SHIPPED | OUTPATIENT
Start: 2025-03-21

## 2025-05-06 DIAGNOSIS — I10 PRIMARY HYPERTENSION: ICD-10-CM

## 2025-05-07 RX ORDER — LOSARTAN POTASSIUM 50 MG/1
50 TABLET ORAL EVERY MORNING
Qty: 90 TABLET | Refills: 0 | Status: SHIPPED | OUTPATIENT
Start: 2025-05-07

## 2025-05-07 NOTE — TELEPHONE ENCOUNTER
Rx Refill Note  Requested Prescriptions     Pending Prescriptions Disp Refills    losartan (COZAAR) 50 MG tablet [Pharmacy Med Name: LOSARTAN POTASSIUM 50 MG TAB] 90 tablet 0     Sig: TAKE 1 TABLET BY MOUTH EVERY MORNING      Last office visit with prescribing clinician: 2/18/2025   Last telemedicine visit with prescribing clinician: Visit date not found   Next office visit with prescribing clinician: 8/18/2025       Alicia Velasco MA  05/07/25, 11:15 EDT

## 2025-05-11 DIAGNOSIS — I10 PRIMARY HYPERTENSION: ICD-10-CM

## 2025-05-12 RX ORDER — LOSARTAN POTASSIUM 50 MG/1
50 TABLET ORAL EVERY MORNING
Qty: 90 TABLET | Refills: 0 | OUTPATIENT
Start: 2025-05-12

## 2025-05-12 NOTE — TELEPHONE ENCOUNTER
Rx Refill Note  Requested Prescriptions     Pending Prescriptions Disp Refills    losartan (COZAAR) 50 MG tablet [Pharmacy Med Name: LOSARTAN POTASSIUM 50 MG TAB] 90 tablet 0     Sig: TAKE 1 TABLET BY MOUTH EVERY MORNING      Last office visit with prescribing clinician: 2/18/2025   Last telemedicine visit with prescribing clinician: Visit date not found   Next office visit with prescribing clinician: 8/18/2025                         Would you like a call back once the refill request has been completed: [] Yes [] No    If the office needs to give you a call back, can they leave a voicemail: [] Yes [] No    Alicia Velasco MA  05/12/25, 15:29 EDT

## 2025-08-04 DIAGNOSIS — E11.9 TYPE 2 DIABETES MELLITUS WITHOUT COMPLICATION, WITHOUT LONG-TERM CURRENT USE OF INSULIN: ICD-10-CM

## 2025-08-04 DIAGNOSIS — E03.4 HYPOTHYROIDISM DUE TO ACQUIRED ATROPHY OF THYROID: ICD-10-CM

## 2025-08-04 DIAGNOSIS — I10 PRIMARY HYPERTENSION: ICD-10-CM

## 2025-08-04 RX ORDER — LEVOTHYROXINE SODIUM 150 UG/1
150 TABLET ORAL DAILY
Qty: 90 TABLET | Refills: 1 | Status: SHIPPED | OUTPATIENT
Start: 2025-08-04

## 2025-08-04 RX ORDER — METFORMIN HYDROCHLORIDE 500 MG/1
1000 TABLET, EXTENDED RELEASE ORAL
Qty: 180 TABLET | Refills: 1 | Status: SHIPPED | OUTPATIENT
Start: 2025-08-04

## 2025-08-04 RX ORDER — LOSARTAN POTASSIUM 50 MG/1
50 TABLET ORAL EVERY MORNING
Qty: 90 TABLET | Refills: 0 | Status: SHIPPED | OUTPATIENT
Start: 2025-08-04

## 2025-08-18 ENCOUNTER — OFFICE VISIT (OUTPATIENT)
Dept: FAMILY MEDICINE CLINIC | Facility: CLINIC | Age: 89
End: 2025-08-18
Payer: MEDICARE

## 2025-08-18 ENCOUNTER — HOSPITAL ENCOUNTER (OUTPATIENT)
Dept: GENERAL RADIOLOGY | Facility: HOSPITAL | Age: 89
Discharge: HOME | End: 2025-08-18
Admitting: FAMILY MEDICINE
Payer: MEDICARE

## 2025-08-18 VITALS
DIASTOLIC BLOOD PRESSURE: 72 MMHG | HEART RATE: 67 BPM | SYSTOLIC BLOOD PRESSURE: 116 MMHG | HEIGHT: 68 IN | BODY MASS INDEX: 31.52 KG/M2 | WEIGHT: 208 LBS | OXYGEN SATURATION: 98 %

## 2025-08-18 DIAGNOSIS — Z87.891 PERSONAL HISTORY OF TOBACCO USE, PRESENTING HAZARDS TO HEALTH: ICD-10-CM

## 2025-08-18 DIAGNOSIS — R09.81 CHRONIC NASAL CONGESTION: ICD-10-CM

## 2025-08-18 DIAGNOSIS — E11.9 TYPE 2 DIABETES MELLITUS WITHOUT COMPLICATION, WITHOUT LONG-TERM CURRENT USE OF INSULIN: Primary | ICD-10-CM

## 2025-08-18 LAB
EXPIRATION DATE: ABNORMAL
HBA1C MFR BLD: 6.9 % (ref 4.5–5.7)
Lab: ABNORMAL

## 2025-08-18 PROCEDURE — 1126F AMNT PAIN NOTED NONE PRSNT: CPT | Performed by: FAMILY MEDICINE

## 2025-08-18 PROCEDURE — 3044F HG A1C LEVEL LT 7.0%: CPT | Performed by: FAMILY MEDICINE

## 2025-08-18 PROCEDURE — 71046 X-RAY EXAM CHEST 2 VIEWS: CPT

## 2025-08-18 PROCEDURE — 83036 HEMOGLOBIN GLYCOSYLATED A1C: CPT | Performed by: FAMILY MEDICINE

## 2025-08-18 PROCEDURE — 99213 OFFICE O/P EST LOW 20 MIN: CPT | Performed by: FAMILY MEDICINE

## 2025-08-18 RX ORDER — PANTOPRAZOLE SODIUM 20 MG/1
TABLET, DELAYED RELEASE ORAL
COMMUNITY

## 2025-08-18 RX ORDER — ECHINACEA PURPUREA EXTRACT 125 MG
1 TABLET ORAL DAILY
Qty: 30 ML | Refills: 12 | Status: SHIPPED | OUTPATIENT
Start: 2025-08-18